# Patient Record
Sex: FEMALE | Race: WHITE | NOT HISPANIC OR LATINO | Employment: OTHER | URBAN - METROPOLITAN AREA
[De-identification: names, ages, dates, MRNs, and addresses within clinical notes are randomized per-mention and may not be internally consistent; named-entity substitution may affect disease eponyms.]

---

## 2017-01-19 ENCOUNTER — HOSPITAL ENCOUNTER (OUTPATIENT)
Dept: MAMMOGRAPHY | Facility: CLINIC | Age: 52
Discharge: HOME | End: 2017-01-19
Attending: SPECIALIST | Admitting: SPECIALIST
Payer: COMMERCIAL

## 2017-01-19 DIAGNOSIS — Z13.9 SCREENING: ICD-10-CM

## 2017-01-19 PROCEDURE — G0202 SCR MAMMO BI INCL CAD: HCPCS

## 2019-11-09 ENCOUNTER — OFFICE VISIT (OUTPATIENT)
Dept: URGENT CARE | Facility: URGENT CARE | Age: 54
End: 2019-11-09
Payer: COMMERCIAL

## 2019-11-09 VITALS
SYSTOLIC BLOOD PRESSURE: 156 MMHG | TEMPERATURE: 98.6 F | OXYGEN SATURATION: 100 % | WEIGHT: 188.9 LBS | DIASTOLIC BLOOD PRESSURE: 86 MMHG | HEART RATE: 66 BPM

## 2019-11-09 DIAGNOSIS — R30.0 DYSURIA: ICD-10-CM

## 2019-11-09 DIAGNOSIS — M54.16 LUMBAR RADICULOPATHY: Primary | ICD-10-CM

## 2019-11-09 LAB
ALBUMIN UR-MCNC: NEGATIVE MG/DL
APPEARANCE UR: CLEAR
BILIRUB UR QL STRIP: NEGATIVE
COLOR UR AUTO: YELLOW
GLUCOSE UR STRIP-MCNC: NEGATIVE MG/DL
HGB UR QL STRIP: NEGATIVE
KETONES UR STRIP-MCNC: NEGATIVE MG/DL
LEUKOCYTE ESTERASE UR QL STRIP: NEGATIVE
NITRATE UR QL: NEGATIVE
PH UR STRIP: 7 PH (ref 5–7)
SOURCE: NORMAL
SP GR UR STRIP: 1.01 (ref 1–1.03)
UROBILINOGEN UR STRIP-ACNC: 0.2 EU/DL (ref 0.2–1)

## 2019-11-09 PROCEDURE — 99203 OFFICE O/P NEW LOW 30 MIN: CPT | Performed by: PHYSICIAN ASSISTANT

## 2019-11-09 PROCEDURE — 81003 URINALYSIS AUTO W/O SCOPE: CPT | Performed by: PHYSICIAN ASSISTANT

## 2019-11-09 RX ORDER — MONTELUKAST SODIUM 10 MG/1
TABLET ORAL
COMMUNITY
Start: 2019-02-07

## 2019-11-09 RX ORDER — ONDANSETRON 4 MG/1
TABLET, FILM COATED ORAL
Refills: 3 | COMMUNITY
Start: 2019-07-06

## 2019-11-09 RX ORDER — TRAZODONE HYDROCHLORIDE 50 MG/1
25-50 TABLET, FILM COATED ORAL
Refills: 0 | COMMUNITY
Start: 2019-09-23

## 2019-11-09 RX ORDER — LEVOTHYROXINE SODIUM 88 UG/1
TABLET ORAL
COMMUNITY
Start: 2019-03-13 | End: 2024-07-30

## 2019-11-09 RX ORDER — DIAZEPAM 10 MG
TABLET ORAL
Refills: 0 | COMMUNITY
Start: 2019-08-08

## 2019-11-09 RX ORDER — ELETRIPTAN HYDROBROMIDE 40 MG/1
TABLET, FILM COATED ORAL
COMMUNITY
Start: 2009-09-22

## 2019-11-09 NOTE — PATIENT INSTRUCTIONS
Patient Education     Understanding Lumbar Radiculopathy    Lumbar radiculopathy is irritation or inflammation of a nerve root in the low back. It causes symptoms that spread out from the back down one or both legs. To understand this condition, it helps to understand the parts of the spine:    Vertebrae. These are bones that stack to form the spine. The lumbar spine contains the 5 bottom vertebrae.    Disks. These are soft pads of tissue between the vertebrae. They act as shock absorbers for the spine.    Spinal canal. This is a tunnel formed within the stacked vertebrae. In the lumbar spine, nerves run through this canal.    Nerves. These branch off and leave the spinal canal, traveling out to parts of the body. As they leave the spinal canal, nerves pass through openings between the vertebrae. The nerve root is the part of the nerve that is closest to the spinal canal.    Sciatic nerve. This is a large nerve formed from several nerve roots in the low back. This nerve extends down the back of the leg to the foot.  With lumbar radiculopathy, nerve roots in the low back become irritated. This leads to pain and symptoms. The sciatic nerve is commonly involved, so the condition is often called sciatica.  What causes lumbar radiculopathy?  Aging, injury, poor posture, extra body weight, and other issues can lead to problems in the low back. These problems may then irritate nerve roots. They include:    Damage to a disk in the lumbar spine. The damaged disk may then press on nearby nerve roots.    Degeneration from wear and tear, and aging. This can lead to narrowing (stenosis) of the openings between the vertebrae. The narrowed openings press on nerve roots as they leave the spinal canal.    Unstable spine. This is when a vertebra slips forward. It can then press on a nerve root.  Other, less common things can put pressure on nerves in the low back. These include diabetes, infection, or a tumor.  Symptoms of lumbar  radiculopathy  These include:    Pain in the low back    Pain, numbness, tingling, or weakness that travels into the buttocks, hip, groin, or leg    Muscle spasms  Treatment for lumbar radiculopathy  In most cases, your healthcare provider will first try treatments that help relieve symptoms. These may include:    Prescription and over-the-counter pain medicines. These help relieve pain, swelling, and irritation.    Limits on positions and activities that increase pain. But lying in bed or avoiding all movement is only recommended for a short period of time.    Physical therapy, including exercises and stretches. This helps decrease pain and increase movement and function.    Steroid shots into the lower back. This may help relieve symptoms for a time.    Weight-loss program. If you are overweight, losing extra pounds may help relieve symptoms.  In some cases, you may need surgery to fix the underlying problem. This depends on the cause, the symptoms, and how long the pain has lasted.  Possible complications  Over time, an irritated and inflamed nerve may become damaged. This may lead to long-lasting (permanent) numbness or weakness in your legs and feet. If symptoms change suddenly or get worse, be sure to let your healthcare provider know.  When to call your healthcare provider  Call your healthcare provider right away if you have any of these:    New pain or pain that gets worse    New or increasing weakness, tingling, or numbness in your leg or foot    Problems controlling your bladder or bowel   Date Last Reviewed: 3/10/2016    8117-3158 [employer]. [address] [place] [address], [place] [address]. All rights reserved. This information is not intended as a substitute for professional medical care. Always follow your healthcare professional's instructions.

## 2019-11-09 NOTE — PROGRESS NOTES
Nunu Martines is a 54 y.o. female, with a pmhx that includes L5 disc herniation (following--seen at [facility] twice in past 2 months), fibromyalgia, Migraine, Hypothyroidism, asthma, insomnia, presenting to  today requesting screening for UTI.     HPI: she reports back spasm sensation across entire low back for past 1-2 weeks duration. She is following [facility] and has been referred to PT. Patient states it occurred to her that she has had some increased frequency of urination. She is not sure if that is due to increased water intake or if it could be a UTI.     Pt denies any LE weakness, foot drop, bowel or bladder changes, saddle area parasthesias or prolonged LE numbness.        ROS:     CONSITUTIONAL: Denies any fever, chills or acute onset weakness  CARDIAC: Denies any CP or SOB  RESP: Aniceto any cough, CP or SOB    GI: Positive for nausea patient relates to dx of atypical migraines below in Neuro ROS. Denies any abdominal pain. Denies any F/C/V/D.   URINARY: Patient reports increased urinary frequency. She denies any dysuria, urinary urgency/frequncy, hematuria  GYN: Denies any pelvic pain. No abnormal vaginal discharge.   SKIN: Denies rash   NEURO: Positive for hx of atypical migraine HA that she describes as nausea without any associated HA that is triggered by barometric weather changes. Patient has rx for prn Zofran. She has had some need for Zofran recently. Denies any confusion or mental status changes  ENDOCRINE: Patient tells me she has been screened for DM with negative result      PMHX: L5 disc herniation (following--seen at [facility] twice in past 2 months), fibromyalgia, Migraine, Hypothyroidism, asthma, insomnia        Current Outpatient Medications   Medication     ALBUTEROL IN     eletriptan (RELPAX) 40 MG tablet     levothyroxine (SYNTHROID/LEVOTHROID) 88 MCG tablet     montelukast (SINGULAIR) 10 MG tablet     diazepam (VALIUM) 10 MG tablet     ondansetron (ZOFRAN) 4 MG tablet     traZODone  (DESYREL) 50 MG tablet     No current facility-administered medications for this visit.      Allergies   Allergen Reactions     Penicillins Anaphylaxis and Rash     PN: LW Reaction: SYNCOPE       Hydrocodone-Acetaminophen Itching and Nausea and Vomiting     Other reaction(s): GI intolerance  PN: LW Reaction: Itching, Pruritis       Nsaids Other (See Comments)     PN: LW Reaction: EDEMA, GENERALIZED     Sulfa Drugs Nausea and Vomiting and Other (See Comments)             OBJECTIVE:  BP (!) 156/86 (BP Location: Right arm, Patient Position: Chair, Cuff Size: Adult Large)   Pulse 66   Temp 98.6  F (37  C) (Oral)   Wt 85.7 kg (188 lb 14.4 oz)   SpO2 100%       GENERAL:  Very pleasant, comfortable and generally well appearing.  Back examination: Back symmetric, no curvature.  Mild lumbar paravertebral muscle spasm bilaterally. Neuro:  Gait within normal limits.  Quadriceps and great toe strength good and equal bilaterally.  Patellar  reflexes good and equal bilaterally. Sensation in multiple dermatomal distributions LE good and equal bilaterally.  Able to forward bend with fingertips to mid tibia.  Normal side bending.    STRAIGHT LEG RAISE: negative   RESP: lungs clear to auscultation - no rales, rhonchi or wheezes  CV: regular rates and rhythm, normal S1 S2, no murmur noted  ABDOMEN:  soft, nontender, no HSM or masses and bowel sounds normal. No CVA tenderness. No suprapubic tenderness   NEURO: Normal strength and tone with no weakness or sensory deficit noted, reflexes normal   SKIN: no suspicious lesions or rashes  PSYCH: NAD     Offered but declines glucose screening/diabetes screening     Results for orders placed or performed in visit on 11/09/19   UA reflex to Microscopic and Culture     Status: None   Result Value Ref Range    Color Urine Yellow     Appearance Urine Clear     Glucose Urine Negative NEG^Negative mg/dL    Bilirubin Urine Negative NEG^Negative    Ketones Urine Negative NEG^Negative mg/dL     "Specific Gravity Urine 1.015 1.003 - 1.035    Blood Urine Negative NEG^Negative    pH Urine 7.0 5.0 - 7.0 pH    Protein Albumin Urine Negative NEG^Negative mg/dL    Urobilinogen Urine 0.2 0.2 - 1.0 EU/dL    Nitrite Urine Negative NEG^Negative    Leukocyte Esterase Urine Negative NEG^Negative    Source Midstream Urine        ASSESSMENT/PLAN:    (M54.16) Lumbar radiculopathy  (primary encounter diagnosis)  MDM: Prolonged, bilateral, low back spasms in a 54 y.o. woman with a pmhx that includes L5 disc herniation (following--seen at [facility] twice in past 2 months), fibromyalgia, Migraine, Hypothyroidism, asthma, insomnia requesting evaluation for possible co-existing UTI. No evidence of UTI on screening UA. Exam is reassuring. Patient is offered but declines glucose/diabetes screening today.     Plan:     1. Continue to follow with orthopedic and PT MD as previously advised for back pain   2. Back urgency and emergency criteria are reviewed   3. Classic UTI signs and sxs are reviewed with patient. Follow-up if any sxs develope  4.  In addition to the above, back \"red flag\" signs and sxs are reviewed with pt both verbally and by way of printed educational material for home review.  Pt verbalizes understanding of and agrees to the above plan.       (R30.0) Dysuria  Comment: atypical   Plan: UA reflex to Microscopic and Culture        "

## 2019-12-15 ENCOUNTER — HEALTH MAINTENANCE LETTER (OUTPATIENT)
Age: 54
End: 2019-12-15

## 2020-10-16 ENCOUNTER — ANCILLARY PROCEDURE (OUTPATIENT)
Dept: MAMMOGRAPHY | Facility: CLINIC | Age: 55
End: 2020-10-16
Payer: COMMERCIAL

## 2020-10-16 DIAGNOSIS — Z12.31 VISIT FOR SCREENING MAMMOGRAM: ICD-10-CM

## 2020-10-16 PROCEDURE — 77063 BREAST TOMOSYNTHESIS BI: CPT | Performed by: RADIOLOGY

## 2020-10-16 PROCEDURE — 77067 SCR MAMMO BI INCL CAD: CPT | Performed by: RADIOLOGY

## 2021-01-15 ENCOUNTER — HEALTH MAINTENANCE LETTER (OUTPATIENT)
Age: 56
End: 2021-01-15

## 2021-10-24 ENCOUNTER — HEALTH MAINTENANCE LETTER (OUTPATIENT)
Age: 56
End: 2021-10-24

## 2021-12-19 ENCOUNTER — HEALTH MAINTENANCE LETTER (OUTPATIENT)
Age: 56
End: 2021-12-19

## 2022-02-13 ENCOUNTER — HEALTH MAINTENANCE LETTER (OUTPATIENT)
Age: 57
End: 2022-02-13

## 2022-03-15 ENCOUNTER — OFFICE VISIT (OUTPATIENT)
Dept: VASCULAR SURGERY | Facility: CLINIC | Age: 57
End: 2022-03-15
Payer: COMMERCIAL

## 2022-03-15 DIAGNOSIS — I83.893 VARICOSE VEINS OF LEG WITH SWELLING, BILATERAL: Primary | ICD-10-CM

## 2022-03-15 DIAGNOSIS — E66.811 CLASS 1 OBESITY WITH SERIOUS COMORBIDITY AND BODY MASS INDEX (BMI) OF 31.0 TO 31.9 IN ADULT, UNSPECIFIED OBESITY TYPE: ICD-10-CM

## 2022-03-15 PROCEDURE — 99203 OFFICE O/P NEW LOW 30 MIN: CPT | Performed by: SURGERY

## 2022-03-15 RX ORDER — CETIRIZINE HYDROCHLORIDE 10 MG/1
1 TABLET ORAL PRN
COMMUNITY

## 2022-03-15 RX ORDER — METHYLPREDNISOLONE 4 MG/1
TABLET ORAL
COMMUNITY
Start: 2021-08-12 | End: 2024-07-30

## 2022-03-15 RX ORDER — HYDROCHLOROTHIAZIDE 25 MG/1
TABLET ORAL
COMMUNITY
Start: 2021-10-22

## 2022-03-15 NOTE — LETTER
"    3/15/2022         RE: Nunu Martines  [address] [facility] [id number]        Dear Colleague,    Thank you for referring your patient, Nunu Martines, to the [facility] [place]. Please see a copy of my visit note below.    VEINSOLUTIONS CONSULTATION    HPI:    Nunu Martines is a pleasant 56 year old female referred by Dr. Ro for several concerns.  The first is with bilateral lower extremity edema which she has had intermittent in the past but has been able to eliminate.  The most recent swelling seems to have begun in October 2021 after taking NSAIDs which she says caused her legs to swell.  She has had a difficult time eliminating swelling since then.  She was placed on a diuretic for period of time but without much help in controlling her edema.    She has not noted varicose veins, erythema of her legs, pleuritic chest pain or shortness of breath.  She has not traveled around the time of the onset of swelling either.  She has not had any venous imaging.    The patient also states that she has \"cold feet\" which become worse after taking naps and when retiring to bed at night if she does not soak her feet in a warm tub prior to retiring to bed.  She does not describe significant pain but admits that they sometimes become somewhat gray.    The next concern is of radicular pain which may be originating from lumbar disc pathology.  The pain extends from the low back into the right buttock and down the posterior right thigh.  She wonders if this may be contributing to some tingling that she has of her feet from time to time.    The patient admits that she has had sclerotherapy on 2 different occasions, the first around 2003 2004 and the second in 2008.  With the first treatment, she had some bulging posterior thigh veins that were injected with improvement in them.  He has not complained of pain in the area of veins.  She has no history of deep vein thrombosis, " superficial thrombophlebitis or hemorrhage.  Patient admits to a right knee injury with a right lateral meniscal tear.    Her family history is significant for spider veins in her mother.     PAST MEDICAL HISTORY: No past medical history on file.    PAST SURGICAL HISTORY: No past surgical history on file.    FAMILY HISTORY: No family history on file.    SOCIAL HISTORY:   Social History     Tobacco Use     Smoking status: Never Smoker     Smokeless tobacco: Never Used   Substance Use Topics     Alcohol use: Not on file       REVIEW OF SYSTEMS: Review Of Systems  Skin: itching  Eyes: negative  Ears/Nose/Throat: Sore throat, dizziness  Respiratory: No shortness of breath, dyspnea on exertion, cough, or hemoptysis  Cardiovascular: negative  Gastrointestinal: Nausea from migraines  Genitourinary: negative  Musculoskeletal: Arthritis, back pain, leg pain, foot pain, weakness, leg discomfort and leg swelling  Neurologic: migraine headaches  Psychiatric: Depression, anxiety, panic attacks  Hematologic/Lymphatic/Immunologic: negative  Endocrine: Hypothyroidism, hot flashes      Vital signs:  There were no vitals taken for this visit.    Current Outpatient Medications   Medication Sig Dispense Refill     levothyroxine (SYNTHROID/LEVOTHROID) 88 MCG tablet TAKE 1 TABLET (88 MCG) BY MOUTH ONCE DAILY FOR 90 DAYS       montelukast (SINGULAIR) 10 MG tablet TAKE 1 TABLET BY MOUTH ONCE EVERY EVENING.       traZODone (DESYREL) 50 MG tablet 25-50 MG per patient  0     ALBUTEROL IN Inhale 1-2 puffs into the lungs       cetirizine (ZYRTEC ALLERGY) 10 MG tablet Take 1 tablet by mouth as needed       diazepam (VALIUM) 10 MG tablet TAKE 1 TABLET BY MOUTH EVERY 8 HOURS AS NEEDED  0     eletriptan (RELPAX) 40 MG tablet as needed.       hydrochlorothiazide (HYDRODIURIL) 25 MG tablet TAKE 1 (ONE) TABLET DAILY IN THE MORNING AS NEEDED       methylPREDNISolone (MEDROL) 4 MG tablet TAKE 1 TABLET BY MOUTH TWICE A DAY       ondansetron (ZOFRAN) 4 MG  tablet TAKE ONE TABLET BY MOUTH UP TO THREE TIMES PER DAY AS NEEDED  3       PHYSICAL EXAM:  General: Pleasant, NAD.   HEENT: Normocephalic, atraumatic, external ears and nose normal.   Respiratory: Normal respiratory effort.   Cardiovascular: Pulse is regular.   Musculoskeletal: Gait and station normal.  The joints of her fingers and toes without deformity.  There is no cyanosis of her nailbeds.   EXTREMITIES: Right lower extremity: Scattered telangiectasias over the posterior lateral thigh with a reticular vein clot/small varicose vein on the distal posterior lateral right thigh.  No stasis changes.  Trace edema.    Left lower extremity: Few scattered telangiectasias over the thigh.  Few reticular veins scattered over the thigh but no significant varicose veins, stasis changes.  She has trace edema..    PULSES: R/L (3=normal pulse, 0=no palpable pulse) femoral: 3/3; popliteal: 3/3 dorsalis pedis: 3/3; posterior tibial: 3/3.      Neurologic: Grossly normal  Psychiatric: Mood, affect, judgment and insight are normal     ASSESSMENT:  Bilateral lower extremity swelling with reticular veins and telangiectasias as well as cold feet bilaterally.  We discussed emotionally vein anatomy as well as the pathophysiology of venous insufficiency.  She does not have significant varicose veins, therefore the likelihood of having significant underlying venous insufficiency is lower.  Given the swelling, I feel it is reasonable to obtain a bilateral lower extremity venous competency study.    Her cool feet are not on the basis of arterial insufficiency as she has normal lower extremity pulses.  She could have an element of cold-induced vasospasm, discussed briefly.    The tingling of her feet could well be related to hard disc problems but could be from multiple other etiologies as well.    PLAN:  Bilateral lower extremity venous competency study with video visit to discuss results.  The patient inquired about adjuncts and weight  loss.  I will direct her to the [place] weight loss clinic.     Cruz Freitas MD    Dictated using Dragon voice recognition software which may result in transcription errors          Patient Reported symptoms:    Right leg   Heaviness Some of the time   Achiness Some of the time   Swelling Some of the time   Throbbing Some of the time   Itching None of the time   Appearance Moderately noticeable   Impact on work/activities Mildly reduced     Left Leg   Heaviness A little of the time   Achiness A little of the time   Swelling A little of the time  Throbbing Some of the time   Itching None of the time   Appearance Slightly noticeable   Impact on work/activities Mildly reduced       Again, thank you for allowing me to participate in the care of your patient.        Sincerely,        Cruz Freitas MD

## 2022-03-15 NOTE — PROGRESS NOTES
"VEINSOLUTIONS CONSULTATION    HPI:    Nunu Martines is a pleasant 56 year old female referred by Dr. Ro for several concerns.  The first is with bilateral lower extremity edema which she has had intermittent in the past but has been able to eliminate.  The most recent swelling seems to have begun in October 2021 after taking NSAIDs which she says caused her legs to swell.  She has had a difficult time eliminating swelling since then.  She was placed on a diuretic for period of time but without much help in controlling her edema.    She has not noted varicose veins, erythema of her legs, pleuritic chest pain or shortness of breath.  She has not traveled around the time of the onset of swelling either.  She has not had any venous imaging.    The patient also states that she has \"cold feet\" which become worse after taking naps and when retiring to bed at night if she does not soak her feet in a warm tub prior to retiring to bed.  She does not describe significant pain but admits that they sometimes become somewhat gray.    The next concern is of radicular pain which may be originating from lumbar disc pathology.  The pain extends from the low back into the right buttock and down the posterior right thigh.  She wonders if this may be contributing to some tingling that she has of her feet from time to time.    The patient admits that she has had sclerotherapy on 2 different occasions, the first around 2003 2004 and the second in 2008.  With the first treatment, she had some bulging posterior thigh veins that were injected with improvement in them.  He has not complained of pain in the area of veins.  She has no history of deep vein thrombosis, superficial thrombophlebitis or hemorrhage.  Patient admits to a right knee injury with a right lateral meniscal tear.    Her family history is significant for spider veins in her mother.     PAST MEDICAL HISTORY: No past medical history on file.    PAST SURGICAL " HISTORY: No past surgical history on file.    FAMILY HISTORY: No family history on file.    SOCIAL HISTORY:   Social History     Tobacco Use     Smoking status: Never Smoker     Smokeless tobacco: Never Used   Substance Use Topics     Alcohol use: Not on file       REVIEW OF SYSTEMS: Review Of Systems  Skin: itching  Eyes: negative  Ears/Nose/Throat: Sore throat, dizziness  Respiratory: No shortness of breath, dyspnea on exertion, cough, or hemoptysis  Cardiovascular: negative  Gastrointestinal: Nausea from migraines  Genitourinary: negative  Musculoskeletal: Arthritis, back pain, leg pain, foot pain, weakness, leg discomfort and leg swelling  Neurologic: migraine headaches  Psychiatric: Depression, anxiety, panic attacks  Hematologic/Lymphatic/Immunologic: negative  Endocrine: Hypothyroidism, hot flashes      Vital signs:  There were no vitals taken for this visit.    Current Outpatient Medications   Medication Sig Dispense Refill     levothyroxine (SYNTHROID/LEVOTHROID) 88 MCG tablet TAKE 1 TABLET (88 MCG) BY MOUTH ONCE DAILY FOR 90 DAYS       montelukast (SINGULAIR) 10 MG tablet TAKE 1 TABLET BY MOUTH ONCE EVERY EVENING.       traZODone (DESYREL) 50 MG tablet 25-50 MG per patient  0     ALBUTEROL IN Inhale 1-2 puffs into the lungs       cetirizine (ZYRTEC ALLERGY) 10 MG tablet Take 1 tablet by mouth as needed       diazepam (VALIUM) 10 MG tablet TAKE 1 TABLET BY MOUTH EVERY 8 HOURS AS NEEDED  0     eletriptan (RELPAX) 40 MG tablet as needed.       hydrochlorothiazide (HYDRODIURIL) 25 MG tablet TAKE 1 (ONE) TABLET DAILY IN THE MORNING AS NEEDED       methylPREDNISolone (MEDROL) 4 MG tablet TAKE 1 TABLET BY MOUTH TWICE A DAY       ondansetron (ZOFRAN) 4 MG tablet TAKE ONE TABLET BY MOUTH UP TO THREE TIMES PER DAY AS NEEDED  3       PHYSICAL EXAM:  General: Pleasant, NAD.   HEENT: Normocephalic, atraumatic, external ears and nose normal.   Respiratory: Normal respiratory effort.   Cardiovascular: Pulse is regular.    Musculoskeletal: Gait and station normal.  The joints of her fingers and toes without deformity.  There is no cyanosis of her nailbeds.   EXTREMITIES: Right lower extremity: Scattered telangiectasias over the posterior lateral thigh with a reticular vein/small varicose vein on the distal posterior lateral right thigh.  No stasis changes.  Trace edema.    Left lower extremity: Few scattered telangiectasias over the thigh.  Few reticular veins scattered over the thigh but no significant varicose veins, stasis changes.  She has trace edema..    PULSES: R/L (3=normal pulse, 0=no palpable pulse) femoral: 3/3; popliteal: 3/3 dorsalis pedis: 3/3; posterior tibial: 3/3.      Neurologic: Grossly normal  Psychiatric: Mood, affect, judgment and insight are normal     ASSESSMENT:  Bilateral lower extremity swelling with reticular veins and telangiectasias as well as cold feet bilaterally.  We discussed emotionally vein anatomy as well as the pathophysiology of venous insufficiency.  She does not have significant varicose veins, therefore the likelihood of having significant underlying venous insufficiency is lower.  Given the swelling, I feel it is reasonable to obtain a bilateral lower extremity venous competency study.    Her cool feet are not on the basis of arterial insufficiency as she has normal lower extremity pulses.  She could have an element of cold-induced vasospasm, discussed briefly.    The tingling of her feet could well be related to hard disc problems but could be from multiple other etiologies as well.    PLAN:  Bilateral lower extremity venous competency study with video visit to discuss results.  The patient inquired about adjuncts and weight loss.  I will direct her to the [facility] weight loss clinic.     Cruz Freitas MD    Dictated using Dragon voice recognition software which may result in transcription errors          Patient Reported symptoms:    Right leg   Heaviness Some of the time    Achiness Some of the time   Swelling Some of the time   Throbbing Some of the time   Itching None of the time   Appearance Moderately noticeable   Impact on work/activities Mildly reduced     Left Leg   Heaviness A little of the time   Achiness A little of the time   Swelling A little of the time  Throbbing Some of the time   Itching None of the time   Appearance Slightly noticeable   Impact on work/activities Mildly reduced

## 2022-03-18 ENCOUNTER — ANCILLARY PROCEDURE (OUTPATIENT)
Dept: ULTRASOUND IMAGING | Facility: CLINIC | Age: 57
End: 2022-03-18
Attending: SURGERY
Payer: COMMERCIAL

## 2022-03-18 DIAGNOSIS — I83.893 VARICOSE VEINS OF LEG WITH SWELLING, BILATERAL: ICD-10-CM

## 2022-03-18 PROCEDURE — 93970 EXTREMITY STUDY: CPT | Performed by: SURGERY

## 2022-03-21 ENCOUNTER — VIRTUAL VISIT (OUTPATIENT)
Dept: VASCULAR SURGERY | Facility: CLINIC | Age: 57
End: 2022-03-21
Attending: SURGERY
Payer: COMMERCIAL

## 2022-03-21 DIAGNOSIS — I83.893 VARICOSE VEINS OF LEG WITH SWELLING, BILATERAL: Primary | ICD-10-CM

## 2022-03-21 PROCEDURE — 99213 OFFICE O/P EST LOW 20 MIN: CPT | Mod: 95 | Performed by: SURGERY

## 2022-03-21 NOTE — LETTER
3/21/2022         RE: Nunu Martines  [address] [facility] [id number]        Dear Colleague,    Thank you for referring your patient, Nunu Martines, to the [facility]. Please see a copy of my visit note below.    Nunu is a 56 year old who is being evaluated via a billable video visit.      How would you like to obtain your AVS? MyChart  If the video visit is dropped, the invitation should be resent by: Text to cell phone: [phone]  Will anyone else be joining your video visit? No      Video Start Time: 11:17 AM      Video-Visit Details    Type of service:  Video Visit    Video End Time:11:29 AM    Originating Location (pt. Location): Home    Distant Location (provider location):  [facility]     Platform used for Video Visit: [employer]     [facility] Progress Note    Nunu Martines presents in follow-up of bilateral lower extremity edema, right lower extremity pain and bilateral cold feet.  Please see my consultation of 3/15/2022 for details.  She returned on 3/18/2022 for bilateral lower extremity venous competency studies, the results of which we will discuss on today's video visit    Physical Exam  General: Pleasant female in no acute distress.  Blood pressure 147/75, pulse 79  Extremities: Right lower extremity: Scattered telangiectasias over the posterior lateral thigh with a reticular vein and small varicose vein on the distal posterior lateral right thigh.  No stasis changes.  Trace edema.    Left lower extremity: Few telangiectasias over the thigh.  Reticular veins scattered over the thigh but no significant varicose veins, stasis changes.  She has trace edema..      Ultrasound:  INDICATION:  Varicose veins of bilateral lower extremities with swelling.     EXAM TYPE  BILATERAL LOWER EXTREMITY VENOUS DUPLEX FOR VENOUS INSUFFICIENCY  TECHNICAL SUMMARY     A duplex ultrasound study  using color flow was performed to evaluate the bilateral lower extremity veins for valvular incompetence with the patient in a steep reversed trendelenberg.      RIGHT:     The deep veins demonstrate phasic flow, compress, and respond to augmentations.  There is no evidence of DVT.  The mid femoral vein is incompetent and free of thrombus. The remaining deep veins are competent and free of thrombus.      The GSV demonstrates phasic flow, compresses, and responds to augmentations from the saphenofemoral junction to the ankle with no evidence of thrombus.  The GSV courses superficially out of the fascia from the distal thigh to the mid calf. The great saphenous vein measures 7.2 mm at the saphenofemoral junction, 5.1 mm in the proximal thigh, and 3.6 mm at the knee.The GSV is incompetent at the SFJ and again from the proximal to mid calf with the greatest reflux time of 4586 milliseconds.      The AASV is competent ( 3.1 mm) draining into the saphenofemoral junction.      The Giacomini vein is competent ( 2.4 mm) communicating with the small saphenous vein at the knee level.      The SSV demonstrates phasic flow, compresses, and responds to augmentations from the popliteal space to the ankle.  No thrombus is seen. The saphenopopliteal junction is absent. The SSV is incompetent in the proximal calf with a reflux time of 5246 milliseconds.       Perforators: There is no evidence of incompetent  veins at any level.      A non-vascularized fluid collection, likely a Baker's Cyst, is found in the posteromedial knee measuring 2.7 x 1.4 x .82 cm.     LEFT:     The deep veins demonstrate phasic flow, compress, and respond to augmentations.  There is no reflux or DVT.  us.      The GSV demonstrates phasic flow, compresses and responds to augmentations from the saphenofemoral junction to the ankle with no evidence of thrombus. The great saphenous vein measures 7.3 mm at the saphenofemoral junction, 4.2 mm in the  proximal thigh, and 4.4 mm at the knee.The GSV is incompetent in the mid calf with a reflux time of 1105 milliseconds.       The AASV is competent ( 4.4 mm) draining into the saphenofemoral junction.      The Giacomini vein is competent ( .8 mm) communicating with the small saphenous vein at the knee level.      The SSV demonstrates phasic flow, compresses, and responds to augmentations from the popliteal space to the ankle.  No reflux or thrombus is seen. The saphenopopliteal junction is absent.      Perforators: There is no evidence of incompetent  veins at any level.      FINAL SUMMARY:  1.         No evidence of bilateral lower extremity deep vein thrombus.  2.         Right mid femoral vein incompetence.  No evidence of left deep vein incompetence.  3.         Right greater saphenous vein incompetence.  4.         Right proximal small saphenous vein incompetence.  5.         Right Baker's Cyst 2.7 x 1.4 x .82 cm  6.         Left greater saphenous vein incompetence in the mid calf only.  7.         Left varicose vein incompetence.  8.         Superficial and perforating veins time of incompetence is >500 ms and >1000 ms for deep vein incompetence    Assessment:  Bilateral lower extremity swelling with some right leg pain and cold feet.  The ultrasound does not reveal any significant superficial or deep venous insufficiency that might explain her symptoms.  The limited incompetence poses very low risk.    We discussed the risks of conservative management which include slight progression of the disease process, superficial thrombophlebitis or hemorrhage.  The patient asked whether the discomfort she is experiencing in the proximal anterolateral right thigh might be explained by the varicosities.  Given her limited saphenofemoral junction and right common femoral vein incompetence, I do not feel those symptoms are related to the ultrasound findings.    Plan:  Continue conservative management with  increasing activities, compression and return if other concerns or questions arise.  The patient and her  voiced understanding and their questions were answered.    Cruz Freitas MD    Dictated using Dragon voice recognition software which may result in transcription errors        Again, thank you for allowing me to participate in the care of your patient.        Sincerely,        Cruz Freitas MD

## 2022-03-21 NOTE — PROGRESS NOTES
Nunu is a 56 year old who is being evaluated via a billable video visit.      How would you like to obtain your AVS? MyChart  If the video visit is dropped, the invitation should be resent by: Text to cell phone: [phone]  Will anyone else be joining your video visit? No      Video Start Time: 11:17 AM      Video-Visit Details    Type of service:  Video Visit    Video End Time:11:29 AM    Originating Location (pt. Location): Home    Distant Location (provider location):  [facility]     Platform used for Video Visit: [employer]     [facility] [place] Progress Note    Nunu Martines presents in follow-up of bilateral lower extremity edema, right lower extremity pain and bilateral cold feet.  Please see my consultation of 3/15/2022 for details.  She returned on 3/18/2022 for bilateral lower extremity venous competency studies, the results of which we will discuss on today's video visit    Physical Exam  General: Pleasant female in no acute distress.  Blood pressure 147/75, pulse 79  Extremities: Right lower extremity: Scattered telangiectasias over the posterior lateral thigh with a reticular vein and small varicose vein on the distal posterior lateral right thigh.  No stasis changes.  Trace edema.    Left lower extremity: Few telangiectasias over the thigh.  Reticular veins scattered over the thigh but no significant varicose veins, stasis changes.  She has trace edema..      Ultrasound:  INDICATION:  Varicose veins of bilateral lower extremities with swelling.     EXAM TYPE  BILATERAL LOWER EXTREMITY VENOUS DUPLEX FOR VENOUS INSUFFICIENCY  TECHNICAL SUMMARY     A duplex ultrasound study using color flow was performed to evaluate the bilateral lower extremity veins for valvular incompetence with the patient in a steep reversed trendelenberg.      RIGHT:     The deep veins demonstrate phasic flow, compress, and respond to augmentations.  There is no  evidence of DVT.  The mid femoral vein is incompetent and free of thrombus. The remaining deep veins are competent and free of thrombus.      The GSV demonstrates phasic flow, compresses, and responds to augmentations from the saphenofemoral junction to the ankle with no evidence of thrombus.  The GSV courses superficially out of the fascia from the distal thigh to the mid calf. The great saphenous vein measures 7.2 mm at the saphenofemoral junction, 5.1 mm in the proximal thigh, and 3.6 mm at the knee.The GSV is incompetent at the SFJ and again from the proximal to mid calf with the greatest reflux time of 4586 milliseconds.      The AASV is competent ( 3.1 mm) draining into the saphenofemoral junction.      The Giacomini vein is competent ( 2.4 mm) communicating with the small saphenous vein at the knee level.      The SSV demonstrates phasic flow, compresses, and responds to augmentations from the popliteal space to the ankle.  No thrombus is seen. The saphenopopliteal junction is absent. The SSV is incompetent in the proximal calf with a reflux time of 5246 milliseconds.       Perforators: There is no evidence of incompetent  veins at any level.      A non-vascularized fluid collection, likely a Baker's Cyst, is found in the posteromedial knee measuring 2.7 x 1.4 x .82 cm.     LEFT:     The deep veins demonstrate phasic flow, compress, and respond to augmentations.  There is no reflux or DVT.  us.      The GSV demonstrates phasic flow, compresses and responds to augmentations from the saphenofemoral junction to the ankle with no evidence of thrombus. The great saphenous vein measures 7.3 mm at the saphenofemoral junction, 4.2 mm in the proximal thigh, and 4.4 mm at the knee.The GSV is incompetent in the mid calf with a reflux time of 1105 milliseconds.       The AASV is competent ( 4.4 mm) draining into the saphenofemoral junction.      The Giacomini vein is competent ( .8 mm) communicating with the  small saphenous vein at the knee level.      The SSV demonstrates phasic flow, compresses, and responds to augmentations from the popliteal space to the ankle.  No reflux or thrombus is seen. The saphenopopliteal junction is absent.      Perforators: There is no evidence of incompetent  veins at any level.      FINAL SUMMARY:  1.         No evidence of bilateral lower extremity deep vein thrombus.  2.         Right mid femoral vein incompetence.  No evidence of left deep vein incompetence.  3.         Right greater saphenous vein incompetence.  4.         Right proximal small saphenous vein incompetence.  5.         Right Baker's Cyst 2.7 x 1.4 x .82 cm  6.         Left greater saphenous vein incompetence in the mid calf only.  7.         Left varicose vein incompetence.  8.         Superficial and perforating veins time of incompetence is >500 ms and >1000 ms for deep vein incompetence    Assessment:  Bilateral lower extremity swelling with some right leg pain and cold feet.  The ultrasound does not reveal any significant superficial or deep venous insufficiency that might explain her symptoms.  The limited incompetence poses very low risk.    We discussed the risks of conservative management which include slight progression of the disease process, superficial thrombophlebitis or hemorrhage.  The patient asked whether the discomfort she is experiencing in the proximal anterolateral right thigh might be explained by the varicosities.  Given her limited saphenofemoral junction and right common femoral vein incompetence, I do not feel those symptoms are related to the ultrasound findings.    Plan:  Continue conservative management with increasing activities, compression and return if other concerns or questions arise.  The patient and her  voiced understanding and their questions were answered.    Cruz Freitas MD    Dictated using Dragon voice recognition software which may result in  transcription errors

## 2022-10-16 ENCOUNTER — HEALTH MAINTENANCE LETTER (OUTPATIENT)
Age: 57
End: 2022-10-16

## 2023-03-26 ENCOUNTER — HEALTH MAINTENANCE LETTER (OUTPATIENT)
Age: 58
End: 2023-03-26

## 2024-06-01 ENCOUNTER — HEALTH MAINTENANCE LETTER (OUTPATIENT)
Age: 59
End: 2024-06-01

## 2024-07-30 ENCOUNTER — APPOINTMENT (OUTPATIENT)
Dept: GENERAL RADIOLOGY | Facility: CLINIC | Age: 59
End: 2024-07-30
Attending: EMERGENCY MEDICINE
Payer: COMMERCIAL

## 2024-07-30 ENCOUNTER — HOSPITAL ENCOUNTER (OUTPATIENT)
Facility: CLINIC | Age: 59
Setting detail: OBSERVATION
Discharge: HOME | End: 2024-07-30
Attending: EMERGENCY MEDICINE | Admitting: INTERNAL MEDICINE
Payer: COMMERCIAL

## 2024-07-30 VITALS
OXYGEN SATURATION: 93 % | SYSTOLIC BLOOD PRESSURE: 180 MMHG | HEART RATE: 86 BPM | RESPIRATION RATE: 22 BRPM | TEMPERATURE: 97.7 F | DIASTOLIC BLOOD PRESSURE: 102 MMHG

## 2024-07-30 DIAGNOSIS — J96.00 ACUTE RESPIRATORY FAILURE, UNSPECIFIED WHETHER WITH HYPOXIA OR HYPERCAPNIA (H): ICD-10-CM

## 2024-07-30 DIAGNOSIS — U07.1 COVID-19 VIRUS INFECTION: ICD-10-CM

## 2024-07-30 DIAGNOSIS — J45.21 MILD INTERMITTENT ASTHMA WITH EXACERBATION: ICD-10-CM

## 2024-07-30 LAB
ALBUMIN SERPL BCG-MCNC: 4.6 G/DL (ref 3.5–5.2)
ALP SERPL-CCNC: 118 U/L (ref 40–150)
ALT SERPL W P-5'-P-CCNC: 29 U/L (ref 0–50)
ANION GAP SERPL CALCULATED.3IONS-SCNC: 16 MMOL/L (ref 7–15)
ANION GAP SERPL CALCULATED.3IONS-SCNC: 20 MMOL/L (ref 7–15)
AST SERPL W P-5'-P-CCNC: 37 U/L (ref 0–45)
BASE EXCESS BLDV CALC-SCNC: -1.6 MMOL/L (ref -3–3)
BASE EXCESS BLDV CALC-SCNC: 0.8 MMOL/L (ref -3–3)
BASOPHILS # BLD AUTO: 0 10E3/UL (ref 0–0.2)
BASOPHILS NFR BLD AUTO: 0 %
BILIRUB SERPL-MCNC: 0.2 MG/DL
BUN SERPL-MCNC: 10.9 MG/DL (ref 6–20)
BUN SERPL-MCNC: 13.4 MG/DL (ref 6–20)
CALCIUM SERPL-MCNC: 9.3 MG/DL (ref 8.8–10.4)
CALCIUM SERPL-MCNC: 9.6 MG/DL (ref 8.8–10.4)
CHLORIDE SERPL-SCNC: 103 MMOL/L (ref 98–107)
CHLORIDE SERPL-SCNC: 104 MMOL/L (ref 98–107)
CREAT SERPL-MCNC: 0.6 MG/DL (ref 0.51–0.95)
CREAT SERPL-MCNC: 0.75 MG/DL (ref 0.51–0.95)
EGFRCR SERPLBLD CKD-EPI 2021: >90 ML/MIN/1.73M2
EGFRCR SERPLBLD CKD-EPI 2021: >90 ML/MIN/1.73M2
EOSINOPHIL # BLD AUTO: 0.2 10E3/UL (ref 0–0.7)
EOSINOPHIL NFR BLD AUTO: 2 %
ERYTHROCYTE [DISTWIDTH] IN BLOOD BY AUTOMATED COUNT: 12.4 % (ref 10–15)
ERYTHROCYTE [DISTWIDTH] IN BLOOD BY AUTOMATED COUNT: 12.6 % (ref 10–15)
FLUAV RNA SPEC QL NAA+PROBE: NEGATIVE
FLUBV RNA RESP QL NAA+PROBE: NEGATIVE
GLUCOSE SERPL-MCNC: 103 MG/DL (ref 70–99)
GLUCOSE SERPL-MCNC: 170 MG/DL (ref 70–99)
HCG SERPL QL: NEGATIVE
HCO3 BLDV-SCNC: 20 MMOL/L (ref 21–28)
HCO3 BLDV-SCNC: 24 MMOL/L (ref 21–28)
HCO3 SERPL-SCNC: 18 MMOL/L (ref 22–29)
HCO3 SERPL-SCNC: 23 MMOL/L (ref 22–29)
HCT VFR BLD AUTO: 42.4 % (ref 35–47)
HCT VFR BLD AUTO: 44.1 % (ref 35–47)
HGB BLD-MCNC: 14.1 G/DL (ref 11.7–15.7)
HGB BLD-MCNC: 14.5 G/DL (ref 11.7–15.7)
HOLD SPECIMEN: NORMAL
HOLD SPECIMEN: NORMAL
IMM GRANULOCYTES # BLD: 0 10E3/UL
IMM GRANULOCYTES NFR BLD: 0 %
LACTATE SERPL-SCNC: 4.6 MMOL/L (ref 0.7–2)
LYMPHOCYTES # BLD AUTO: 3.1 10E3/UL (ref 0.8–5.3)
LYMPHOCYTES NFR BLD AUTO: 30 %
MCH RBC QN AUTO: 30.7 PG (ref 26.5–33)
MCH RBC QN AUTO: 30.9 PG (ref 26.5–33)
MCHC RBC AUTO-ENTMCNC: 32.9 G/DL (ref 31.5–36.5)
MCHC RBC AUTO-ENTMCNC: 33.3 G/DL (ref 31.5–36.5)
MCV RBC AUTO: 92 FL (ref 78–100)
MCV RBC AUTO: 94 FL (ref 78–100)
MONOCYTES # BLD AUTO: 0.8 10E3/UL (ref 0–1.3)
MONOCYTES NFR BLD AUTO: 8 %
NEUTROPHILS # BLD AUTO: 6.2 10E3/UL (ref 1.6–8.3)
NEUTROPHILS NFR BLD AUTO: 60 %
NRBC # BLD AUTO: 0 10E3/UL
NRBC BLD AUTO-RTO: 0 /100
O2/TOTAL GAS SETTING VFR VENT: 0 %
O2/TOTAL GAS SETTING VFR VENT: 40 %
OXYHGB MFR BLDV: 89 % (ref 70–75)
OXYHGB MFR BLDV: 96 % (ref 70–75)
PCO2 BLDV: 27 MM HG (ref 40–50)
PCO2 BLDV: 35 MM HG (ref 40–50)
PH BLDV: 7.46 [PH] (ref 7.32–7.43)
PH BLDV: 7.48 [PH] (ref 7.32–7.43)
PLATELET # BLD AUTO: 234 10E3/UL (ref 150–450)
PLATELET # BLD AUTO: 255 10E3/UL (ref 150–450)
PO2 BLDV: 55 MM HG (ref 25–47)
PO2 BLDV: 73 MM HG (ref 25–47)
POTASSIUM SERPL-SCNC: 3.4 MMOL/L (ref 3.4–5.3)
POTASSIUM SERPL-SCNC: 3.6 MMOL/L (ref 3.4–5.3)
PROT SERPL-MCNC: 7.6 G/DL (ref 6.4–8.3)
RBC # BLD AUTO: 4.59 10E6/UL (ref 3.8–5.2)
RBC # BLD AUTO: 4.7 10E6/UL (ref 3.8–5.2)
RSV RNA SPEC NAA+PROBE: NEGATIVE
SAO2 % BLDV: 90 % (ref 70–75)
SAO2 % BLDV: 97 % (ref 70–75)
SARS-COV-2 RNA RESP QL NAA+PROBE: POSITIVE
SODIUM SERPL-SCNC: 142 MMOL/L (ref 135–145)
SODIUM SERPL-SCNC: 142 MMOL/L (ref 135–145)
TROPONIN T SERPL HS-MCNC: 10 NG/L
TROPONIN T SERPL HS-MCNC: 9 NG/L
WBC # BLD AUTO: 10.4 10E3/UL (ref 4–11)
WBC # BLD AUTO: 8.6 10E3/UL (ref 4–11)

## 2024-07-30 PROCEDURE — 999N000157 HC STATISTIC RCP TIME EA 10 MIN

## 2024-07-30 PROCEDURE — 250N000009 HC RX 250: Performed by: EMERGENCY MEDICINE

## 2024-07-30 PROCEDURE — 82040 ASSAY OF SERUM ALBUMIN: CPT | Performed by: INTERNAL MEDICINE

## 2024-07-30 PROCEDURE — 84484 ASSAY OF TROPONIN QUANT: CPT | Performed by: INTERNAL MEDICINE

## 2024-07-30 PROCEDURE — 99285 EMERGENCY DEPT VISIT HI MDM: CPT | Mod: 25

## 2024-07-30 PROCEDURE — 99418 PROLNG IP/OBS E/M EA 15 MIN: CPT | Performed by: INTERNAL MEDICINE

## 2024-07-30 PROCEDURE — 82805 BLOOD GASES W/O2 SATURATION: CPT | Performed by: INTERNAL MEDICINE

## 2024-07-30 PROCEDURE — 83605 ASSAY OF LACTIC ACID: CPT | Performed by: INTERNAL MEDICINE

## 2024-07-30 PROCEDURE — 80048 BASIC METABOLIC PNL TOTAL CA: CPT | Performed by: EMERGENCY MEDICINE

## 2024-07-30 PROCEDURE — 36415 COLL VENOUS BLD VENIPUNCTURE: CPT | Performed by: INTERNAL MEDICINE

## 2024-07-30 PROCEDURE — 82805 BLOOD GASES W/O2 SATURATION: CPT | Performed by: EMERGENCY MEDICINE

## 2024-07-30 PROCEDURE — 87637 SARSCOV2&INF A&B&RSV AMP PRB: CPT | Performed by: EMERGENCY MEDICINE

## 2024-07-30 PROCEDURE — 71045 X-RAY EXAM CHEST 1 VIEW: CPT

## 2024-07-30 PROCEDURE — 85004 AUTOMATED DIFF WBC COUNT: CPT | Performed by: EMERGENCY MEDICINE

## 2024-07-30 PROCEDURE — 99207 PR APP CREDIT; MD BILLING SHARED VISIT: CPT | Performed by: INTERNAL MEDICINE

## 2024-07-30 PROCEDURE — 36415 COLL VENOUS BLD VENIPUNCTURE: CPT | Performed by: EMERGENCY MEDICINE

## 2024-07-30 PROCEDURE — 99236 HOSP IP/OBS SAME DATE HI 85: CPT | Performed by: INTERNAL MEDICINE

## 2024-07-30 PROCEDURE — G0378 HOSPITAL OBSERVATION PER HR: HCPCS

## 2024-07-30 PROCEDURE — 96365 THER/PROPH/DIAG IV INF INIT: CPT

## 2024-07-30 PROCEDURE — 80053 COMPREHEN METABOLIC PANEL: CPT | Performed by: EMERGENCY MEDICINE

## 2024-07-30 PROCEDURE — 85018 HEMOGLOBIN: CPT | Performed by: INTERNAL MEDICINE

## 2024-07-30 PROCEDURE — 250N000011 HC RX IP 250 OP 636: Performed by: EMERGENCY MEDICINE

## 2024-07-30 PROCEDURE — 94660 CPAP INITIATION&MGMT: CPT

## 2024-07-30 PROCEDURE — 250N000013 HC RX MED GY IP 250 OP 250 PS 637: Performed by: INTERNAL MEDICINE

## 2024-07-30 PROCEDURE — 84703 CHORIONIC GONADOTROPIN ASSAY: CPT | Performed by: INTERNAL MEDICINE

## 2024-07-30 PROCEDURE — 96375 TX/PRO/DX INJ NEW DRUG ADDON: CPT

## 2024-07-30 PROCEDURE — 94640 AIRWAY INHALATION TREATMENT: CPT

## 2024-07-30 RX ORDER — CLINDAMYCIN PHOSPHATE 10 UG/ML
LOTION TOPICAL 2 TIMES DAILY PRN
COMMUNITY

## 2024-07-30 RX ORDER — ONDANSETRON 2 MG/ML
4 INJECTION INTRAMUSCULAR; INTRAVENOUS EVERY 6 HOURS PRN
Status: DISCONTINUED | OUTPATIENT
Start: 2024-07-30 | End: 2024-07-30 | Stop reason: HOSPADM

## 2024-07-30 RX ORDER — AMOXICILLIN 250 MG
1 CAPSULE ORAL 2 TIMES DAILY PRN
Status: DISCONTINUED | OUTPATIENT
Start: 2024-07-30 | End: 2024-07-30 | Stop reason: HOSPADM

## 2024-07-30 RX ORDER — AMOXICILLIN 250 MG
2 CAPSULE ORAL 2 TIMES DAILY PRN
Status: DISCONTINUED | OUTPATIENT
Start: 2024-07-30 | End: 2024-07-30 | Stop reason: HOSPADM

## 2024-07-30 RX ORDER — BENZONATATE 100 MG/1
100 CAPSULE ORAL 3 TIMES DAILY PRN
Qty: 21 CAPSULE | Refills: 0 | Status: SHIPPED | OUTPATIENT
Start: 2024-07-30

## 2024-07-30 RX ORDER — ELETRIPTAN HYDROBROMIDE 20 MG/1
40 TABLET, FILM COATED ORAL
Status: COMPLETED | OUTPATIENT
Start: 2024-07-30 | End: 2024-07-30

## 2024-07-30 RX ORDER — HYDROCORTISONE 2.5 %
CREAM (GRAM) TOPICAL 2 TIMES DAILY PRN
Status: DISCONTINUED | OUTPATIENT
Start: 2024-07-30 | End: 2024-07-30 | Stop reason: HOSPADM

## 2024-07-30 RX ORDER — ALBUTEROL SULFATE 90 UG/1
2 AEROSOL, METERED RESPIRATORY (INHALATION)
Status: DISCONTINUED | OUTPATIENT
Start: 2024-07-30 | End: 2024-07-30 | Stop reason: HOSPADM

## 2024-07-30 RX ORDER — HYDROCORTISONE 2.5 %
CREAM (GRAM) TOPICAL 2 TIMES DAILY PRN
COMMUNITY

## 2024-07-30 RX ORDER — AZITHROMYCIN 250 MG/1
TABLET, FILM COATED ORAL
Qty: 6 TABLET | Refills: 0 | Status: SHIPPED | OUTPATIENT
Start: 2024-07-30 | End: 2024-08-04

## 2024-07-30 RX ORDER — IPRATROPIUM BROMIDE AND ALBUTEROL SULFATE 2.5; .5 MG/3ML; MG/3ML
SOLUTION RESPIRATORY (INHALATION)
Status: DISCONTINUED
Start: 2024-07-30 | End: 2024-07-30 | Stop reason: HOSPADM

## 2024-07-30 RX ORDER — ALBUTEROL SULFATE 90 UG/1
2 AEROSOL, METERED RESPIRATORY (INHALATION) EVERY 6 HOURS PRN
Qty: 18 G | Refills: 0 | Status: SHIPPED | OUTPATIENT
Start: 2024-07-30

## 2024-07-30 RX ORDER — IPRATROPIUM BROMIDE AND ALBUTEROL SULFATE 2.5; .5 MG/3ML; MG/3ML
3 SOLUTION RESPIRATORY (INHALATION)
Status: DISCONTINUED | OUTPATIENT
Start: 2024-07-30 | End: 2024-07-30

## 2024-07-30 RX ORDER — MAGNESIUM SULFATE HEPTAHYDRATE 40 MG/ML
2 INJECTION, SOLUTION INTRAVENOUS ONCE
Status: COMPLETED | OUTPATIENT
Start: 2024-07-30 | End: 2024-07-30

## 2024-07-30 RX ORDER — ONDANSETRON 4 MG/1
4 TABLET, ORALLY DISINTEGRATING ORAL EVERY 6 HOURS PRN
Status: DISCONTINUED | OUTPATIENT
Start: 2024-07-30 | End: 2024-07-30 | Stop reason: HOSPADM

## 2024-07-30 RX ORDER — MONTELUKAST SODIUM 10 MG/1
10 TABLET ORAL AT BEDTIME
Status: DISCONTINUED | OUTPATIENT
Start: 2024-07-30 | End: 2024-07-30 | Stop reason: HOSPADM

## 2024-07-30 RX ORDER — METHYLPREDNISOLONE SODIUM SUCCINATE 125 MG/2ML
125 INJECTION, POWDER, LYOPHILIZED, FOR SOLUTION INTRAMUSCULAR; INTRAVENOUS ONCE
Status: COMPLETED | OUTPATIENT
Start: 2024-07-30 | End: 2024-07-30

## 2024-07-30 RX ORDER — DIPHENHYDRAMINE HCL 25 MG
50 CAPSULE ORAL EVERY 6 HOURS PRN
Status: DISCONTINUED | OUTPATIENT
Start: 2024-07-30 | End: 2024-07-30 | Stop reason: HOSPADM

## 2024-07-30 RX ORDER — ALBUTEROL SULFATE 0.83 MG/ML
2.5 SOLUTION RESPIRATORY (INHALATION) EVERY 6 HOURS PRN
Qty: 90 ML | Refills: 0 | Status: SHIPPED | OUTPATIENT
Start: 2024-07-30

## 2024-07-30 RX ORDER — METHYLPREDNISOLONE 4 MG
TABLET, DOSE PACK ORAL
Qty: 21 TABLET | Refills: 0 | Status: SHIPPED | OUTPATIENT
Start: 2024-07-30

## 2024-07-30 RX ORDER — ALBUTEROL SULFATE 90 UG/1
2 AEROSOL, METERED RESPIRATORY (INHALATION) EVERY 4 HOURS
Status: DISCONTINUED | OUTPATIENT
Start: 2024-07-30 | End: 2024-07-30 | Stop reason: HOSPADM

## 2024-07-30 RX ADMIN — IPRATROPIUM BROMIDE AND ALBUTEROL SULFATE 3 ML: .5; 3 SOLUTION RESPIRATORY (INHALATION) at 02:45

## 2024-07-30 RX ADMIN — IPRATROPIUM BROMIDE AND ALBUTEROL SULFATE 3 ML: .5; 3 SOLUTION RESPIRATORY (INHALATION) at 02:30

## 2024-07-30 RX ADMIN — METHYLPREDNISOLONE SODIUM SUCCINATE 125 MG: 125 INJECTION, POWDER, FOR SOLUTION INTRAMUSCULAR; INTRAVENOUS at 02:49

## 2024-07-30 RX ADMIN — MAGNESIUM SULFATE HEPTAHYDRATE 2 G: 40 INJECTION, SOLUTION INTRAVENOUS at 02:45

## 2024-07-30 RX ADMIN — ALBUTEROL SULFATE 2 PUFF: 90 AEROSOL, METERED RESPIRATORY (INHALATION) at 08:49

## 2024-07-30 RX ADMIN — ELETRIPTAN HYDROBROMIDE 40 MG: 20 TABLET, FILM COATED ORAL at 09:54

## 2024-07-30 ASSESSMENT — ACTIVITIES OF DAILY LIVING (ADL)
ADLS_ACUITY_SCORE: 36
ADLS_ACUITY_SCORE: 35
ADLS_ACUITY_SCORE: 36
ADLS_ACUITY_SCORE: 36
ADLS_ACUITY_SCORE: 35
ADLS_ACUITY_SCORE: 36

## 2024-07-30 NOTE — H&P
[facility]    History and Physical - Hospitalist Service       Date of Admission:  7/30/2024    Assessment & Plan      Nunu Martines is a 58 year old female admitted on 7/30/2024. She has PMH notable for intermittent asthma, osteoporosis, migraines that presents with acute hypoxic respiratory failure secondary to asthma exacerbation in the setting of COVID pneumonia.  Her hypoxic respiratory failure has since resolved.    Asthma exacerbation  Severe COVID-pneumonia  Acute hypoxic respiratory failure, now resolved  The patient presented in respiratory distress.  She was hypoxic and was placed on BiPAP.  Magnesium sulfate was given in the ED. She quickly weaned to room air and has been off BiPAP for 2 hours at the time of admission.  At the time of admission she still feels that her work of breathing is increased from baseline.  No signs of pneumonia.  CXR with atelectasis but otherwise clear.  -S/p BiPAP in the ED now weaned to room air  -Given 2 g mag sulfate  -S/p 125 mg Solu-Medrol in ED, the patient is not interested in prednisone or dexamethasone due to concerns of osteoporosis.  We discussed the fact that a short course of steroids will likely not worsen her osteoporosis however she still declined prednisone.  She states she would tolerate a Medrol Dosepak which can be started tomorrow. Has not been ordered as of now.   -Scheduled albuterol inhaler 2 puffs Q4H and every 2 hours as needed  -Discussed remdesivir and Paxlovid, the patient declines these medications  -Hopefully the patient can discharge later today if remaining on room air  -Given the severity of her presentation and the fact has been off BiPAP for 2 hours will recheck a venous blood gas  -No signs of bacterial pneumonia therefore do not see indication for antimicrobials at this time    Chest pressure  The patient complains of substernal chest pressure that began when she woke up in respiratory distress.  This is  "likely related to her asthma exacerbation with anxiety playing a role,  however she states the chest pressure is more severe than what is typical when she has an asthma exacerbation.  As such will check EKG, add on troponin to ER labs and repeat troponin now.  The description of her pain is not typical cardiac chest pain. It is also not consistent with pericarditis.  There is no pneumothorax noted on chest x-ray.  -check EKG, add on trop to ER labs and repeat trop        Observation Goals: -diagnostic tests and consults completed and resulted, -vital signs normal or at patient baseline, Nurse to notify provider when observation goals have been met and patient is ready for discharge.  Diet:  Regular diet  DVT Prophylaxis: Low risk, ambulate as the patient will likely DC later on today.  Hung Catheter: Not present  Lines: None     Cardiac Monitoring: None  Code Status: Full Code      Clinically Significant Risk Factors Present on Admission          Disposition Plan     Medically Ready for Discharge: Anticipated Today       Jonathan Calvo MD  Hospitalist Service  [facility]  Securely message with [employer] (more info)  Text page via [employer] Paging/Directory     ______________________________________________________________________    Chief Complaint   \"This came on quick.\"    History is obtained from the patient    History of Present Illness   Nunu Martines is a 58 year old lady has history of intermittent asthma, osteoporosis, migraines that presents with difficulties breathing.     Her  has been sick with URI.  Last night she had a dry cough.  This morning she woke up at 2 AM with respiratory distress.  She took 2 puffs of her inhaler and this did not improve her breathing so she presented to the emergency department.  She states that she has ongoing chest pressure that started when she woke up this morning.  The chest pressure is more severe than her usual anxiety or asthma " symptoms.  The pain does not radiate to her jaw arm or back.  However despite her breathing improving the chest pressure remains.  No nausea, vomiting, diarrhea, abdominal pain, urinary complaints.   With regards to her asthma she rarely has exacerbations.  She has never been hospitalized for asthma exacerbation.  She feels that she usually has good control of her asthma with her albuterol inhaler.    On arrival to the ED the patient was noted to be in respiratory distress.  She was placed on BiPAP and given 125 mg Solu-Medrol, magnesium sulfate, and DuoNebs.  CXR with mild atelectasis in lung bases but otherwise lungs clear. Covid swab was positive. She quickly weaned to room air and has been off of BiPAP for 2 hours of time admission.     During interview she stated she still felt like her work of breathing was mildly increased from baseline.  She used to be on Synthroid for hypothyroidism but this has since been discontinued.  She is prescribed hydrochlorothiazide but she only takes this as needed for edema.  She is not taking medications that are scheduled every day.  All medications are as needed.      Past Medical History    No past medical history on file.  Varicose veins  Panic disorder  Insomnia  Asthma  Allergic rhinitis  Constipation  Hypothyroidism  Uninodular goiter  Lumbago  Migraine  Temporomandibular joint disorder  Fibromyalgia   Degenerative disk disorder  Chronic bilateral low back pain    Past Surgical History   No past surgical history on file.    Prior to Admission Medications   Prior to Admission Medications   Prescriptions Last Dose Informant Patient Reported? Taking?   ALBUTEROL IN   Yes No   Sig: Inhale 1-2 puffs into the lungs   cetirizine (ZYRTEC ALLERGY) 10 MG tablet   Yes No   Sig: Take 1 tablet by mouth as needed   diazepam (VALIUM) 10 MG tablet   Yes No   Sig: TAKE 1 TABLET BY MOUTH EVERY 8 HOURS AS NEEDED   eletriptan (RELPAX) 40 MG tablet   Yes No   Sig: as needed.    hydrochlorothiazide (HYDRODIURIL) 25 MG tablet   Yes No   Sig: TAKE 1 (ONE) TABLET DAILY IN THE MORNING AS NEEDED   levothyroxine (SYNTHROID/LEVOTHROID) 88 MCG tablet   Yes No   Sig: TAKE 1 TABLET (88 MCG) BY MOUTH ONCE DAILY FOR 90 DAYS   methylPREDNISolone (MEDROL) 4 MG tablet   Yes No   Sig: TAKE 1 TABLET BY MOUTH TWICE A DAY   montelukast (SINGULAIR) 10 MG tablet   Yes No   Sig: TAKE 1 TABLET BY MOUTH ONCE EVERY EVENING.   ondansetron (ZOFRAN) 4 MG tablet   Yes No   Sig: TAKE ONE TABLET BY MOUTH UP TO THREE TIMES PER DAY AS NEEDED   traZODone (DESYREL) 50 MG tablet   Yes No   Si-50 MG per patient      Facility-Administered Medications: None        Review of Systems    The 10 point Review of Systems is negative other than noted in the HPI or here.     Social History   I have reviewed this patient's social history and updated it with pertinent information if needed.  Social History     Tobacco Use    Smoking status: Never    Smokeless tobacco: Never     She does not smoke or use dipping tobacco.  She occasionally drinks alcohol.  No illicit substance use.    Family History     Noncontributory      Allergies   Allergies   Allergen Reactions    Paroxetine Anaphylaxis, Anxiety, Unknown, Dizziness, Nausea, Other (See Comments), Palpitations and Photosensitivity     Other reaction(s): *Unknown, AGITATION, MENTAL STATUS CHANGES, SWEATING  Serotonin syndrome      Penicillins Anaphylaxis and Rash     PN: LW Reaction: SYNCOPE      Hydrocodone-Acetaminophen Itching and Nausea and Vomiting     Other reaction(s): GI intolerance  PN: LW Reaction: Itching, Pruritis      Nsaids Other (See Comments)     PN: LW Reaction: EDEMA, GENERALIZED    Sulfa Antibiotics Nausea and Vomiting and Other (See Comments)        Physical Exam   Vital Signs: Temp: 97.3  F (36.3  C)   BP: (!) 162/87 Pulse: 85   Resp: 17 SpO2: 95 % O2 Device: BiPAP/CPAP    Weight: 0 lbs 0 oz    GENERAL:sitting up in bed, appears mildly anxious, can complete all  of her sentences  HEENT: NC/AT, sclera anicteric   CV: RRR, no M/R/G, CR < 2 s   PULM: Decreased tidal volume, faint end expiratory wheezes in all lung fields, normal work of breathing   GI: ND  MSK: WWP, mild LE edema  NEURO: Awake, alert, oriented to 7/30/2024, CN II-XII grossly intact, ANDERSEN, appears nonfocal  SKIN: no rash           Medical Decision Making       85 MINUTES SPENT BY ME on the date of service doing chart review, history, exam, documentation & further activities per the note.      Data     I have personally reviewed the following data over the past 24 hrs:    10.4  \   14.5   / 255     142 103 13.4 /  103 (H)   3.4 23 0.75 \     Trop: 9 BNP: N/A       Imaging results reviewed over the past 24 hrs:   Recent Results (from the past 24 hour(s))   XR Chest Port 1 View    Narrative    EXAM: XR CHEST PORT 1 VIEW  LOCATION: [facility]  DATE: 7/30/2024    INDICATION: SOB, asthma exacerbation  COMPARISON: None.      Impression    IMPRESSION: Mild atelectasis in the lung bases. Lungs otherwise clear. No pleural effusion or pneumothorax. Normal heart size and pulmonary vascularity.

## 2024-07-30 NOTE — PLAN OF CARE
Discharged home with spouse assisting and transporting as needed, belongings sent along script for new medications sent to pharmacy of choice. Pt and family did not have any question or concerns noted upon departure.

## 2024-07-30 NOTE — DISCHARGE INSTRUCTIONS
Follow up with primary care provider, Marcela Brown, within 7 days for hospital follow- up.  No follow up labs or test are needed.    Recommend you monitor for signs of wheezing or shortness of breath.  You could use your albuterol inhaler if you are having persistent coughing.  The nebulizer may be more beneficial if you are having persistent coughing or difficulty breathing.  If your albuterol inhaler or nebulizer are not helping, recommend you return to the emergency department for evaluation.    You may benefit from following up with your allergist regarding your asthma exacerbation.  This was likely caused by the COVID infection.    Recommend you isolate/quarantine yourself until symptom free or fever free for at least 24 hours.

## 2024-07-30 NOTE — ED TRIAGE NOTES
Pt comes in with family, has had a cough for the past few days, tonight started having severe SOB and wheezing. Has had nebulizers before, hx asthma, no neb taken tonight.

## 2024-07-30 NOTE — DISCHARGE SUMMARY
Hospitalist Discharge Summary  [facility]    Nunu Martines MRN# [id number]   YOB: 1965 Age: 58 year old     Date of Admission:  7/30/2024  Date of Discharge:  7/30/2024  Admitting Physician:  Jonathan Calvo MD  Discharge Physician:  Gregory Martinez DO  Discharging Service:  Hospitalist     Primary Provider: Marcela Brown          Discharge Diagnosis:     Acute Asthma Exacerbation  Covid 19 Infection  - Improved with short time on bipap  - Pt intolerant to prednisone.  Ok with dexamethasone  - Start azithromycin at discharge  - Continue montelukast     Lactic Acidosis  - Likely secondary to respiratory distress.  VSS and pt breathing better.  No plan for repeat check     Chronic Medical Problems:  Multiple Allergies  Fibromyalgia  Osteoporosis  Chronic Pain  Migraines             Discharge Disposition:     Discharged to home           Hospital Course:     Nunu Martines is a 58 year old female with a history of asthma, multiple allergies, fibromyalgia, osteoporosis, chronic pain, migraines admitted on 7/30/2024 with shortness of breath.  In the emergency department, the patient was found to have a temperature of 97.3  F, blood pressure 162/87, heart rate 85, respiratory rate 17, SpO2 95% on continuous BiPAP.  Initial lab work showed anion gap 16, lactic acid 4.6, venous pH 7.46 with a venous pCO2 of 35, WBC 10.4.  Patient tested positive for COVID-19.  Chest x-ray showed mild atelectasis in the lung bases.  The patient did require continuous BiPAP upon arrival, but was quickly titrated off.  She received a dose of IV Solu-Medrol and IV magnesium with some improvement in her symptoms.  At the afternoon of 7/30, the patient's symptoms had improved to the point that she was stable for discharge home to follow-up with her primary care doctor as an outpatient.     The patient was seen, examined, and counseled on this day. The hospitalization and plan of care  "was reviewed with the patient extensively. All questions were addressed and the patient agreed to follow-up as noted above.           Allergies:     Allergies   Allergen Reactions    Paroxetine Anaphylaxis, Anxiety, Unknown, Dizziness, Nausea, Other (See Comments), Palpitations and Photosensitivity     Other reaction(s): *Unknown, AGITATION, MENTAL STATUS CHANGES, SWEATING  Serotonin syndrome      Penicillins Anaphylaxis and Rash     PN: LW Reaction: SYNCOPE      Hydrocodone-Acetaminophen Nausea and Vomiting and Itching     Other reaction(s): GI intolerance  PN: LW Reaction: Itching, Pruritus  Patient is OK with acetaminophen.      Nsaids Other (See Comments)     PN: LW Reaction: EDEMA, GENERALIZED    Sulfa Antibiotics Other (See Comments) and Nausea and Vomiting     Extremely red eyes, like \"vampire\".              Discharge Medications:     Current Discharge Medication List        START taking these medications    Details   albuterol (PROAIR HFA/PROVENTIL HFA/VENTOLIN HFA) 108 (90 Base) MCG/ACT inhaler Inhale 2 puffs into the lungs every 6 hours as needed for shortness of breath, wheezing or cough  Qty: 18 g, Refills: 0    Comments: Pharmacy may dispense brand covered by insurance (Proair, or proventil or ventolin or generic albuterol inhaler)  Associated Diagnoses: Mild intermittent asthma with exacerbation      albuterol (PROVENTIL) (2.5 MG/3ML) 0.083% neb solution Take 1 vial (2.5 mg) by nebulization every 6 hours as needed for shortness of breath, wheezing or cough  Qty: 90 mL, Refills: 0    Associated Diagnoses: Mild intermittent asthma with exacerbation      azithromycin (ZITHROMAX) 250 MG tablet Take 2 tablets (500 mg) by mouth daily for 1 day, THEN 1 tablet (250 mg) daily for 4 days.  Qty: 6 tablet, Refills: 0    Associated Diagnoses: Mild intermittent asthma with exacerbation; Acute respiratory failure, unspecified whether with hypoxia or hypercapnia (H)      benzonatate (TESSALON) 100 MG capsule Take 1 " capsule (100 mg) by mouth 3 times daily as needed for cough  Qty: 21 capsule, Refills: 0    Associated Diagnoses: Mild intermittent asthma with exacerbation      methylPREDNISolone (MEDROL DOSEPAK) 4 MG tablet therapy pack Follow Package Directions  Qty: 21 tablet, Refills: 0    Associated Diagnoses: Mild intermittent asthma with exacerbation; Acute respiratory failure, unspecified whether with hypoxia or hypercapnia (H)           CONTINUE these medications which have NOT CHANGED    Details   cetirizine (ZYRTEC ALLERGY) 10 MG tablet Take 1 tablet by mouth as needed      clindamycin (CLEOCIN T) 1 % external lotion Apply topically 2 times daily as needed (acne)      diazepam (VALIUM) 10 MG tablet TAKE 1 TABLET BY MOUTH EVERY 8 HOURS AS NEEDED  Refills: 0      eletriptan (RELPAX) 40 MG tablet as needed.      hydrochlorothiazide (HYDRODIURIL) 25 MG tablet TAKE 1 (ONE) TABLET DAILY IN THE MORNING AS NEEDED      hydrocortisone 2.5 % cream Apply topically 2 times daily as needed for rash      montelukast (SINGULAIR) 10 MG tablet TAKE 1 TABLET BY MOUTH ONCE EVERY EVENING.      ondansetron (ZOFRAN) 4 MG tablet TAKE ONE TABLET BY MOUTH UP TO THREE TIMES PER DAY AS NEEDED  Refills: 3      traZODone (DESYREL) 50 MG tablet Take 25-50 mg by mouth nightly as needed for sleep 25-50 MG per patient  Refills: 0           STOP taking these medications       ALBUTEROL IN Comments:   Reason for Stopping:                      Condition on Discharge:     Discharge condition: Fair   Discharge vitals: Blood pressure (!) 180/102, pulse 86, temperature 97.7  F (36.5  C), temperature source Axillary, resp. rate 22, SpO2 93%.   Code status on discharge: Full Code      BASIC PHYSICAL EXAMINATION:  GENERAL: No apparent distress.  CARDIOVASCULAR: Regular rate and rhythm without murmurs.  PULMONARY: Clear to auscultation bilaterally.   GASTROINTESTINAL: Abdomen soft, non-tender.  EXTREMITIES: No edema, pulses intact.  NEUROLOGIC: No focal deficits.             History of Illness:   See detailed admission note for full details.               Procedures excluding imaging which is summarized below:     Please see details in the electronic medical record.           Consultations:     None          Significant Results:     Results for orders placed or performed during the hospital encounter of 07/30/24   XR Chest Port 1 View    Narrative    EXAM: XR CHEST PORT 1 VIEW  LOCATION: [facility]  DATE: 7/30/2024    INDICATION: SOB, asthma exacerbation  COMPARISON: None.      Impression    IMPRESSION: Mild atelectasis in the lung bases. Lungs otherwise clear. No pleural effusion or pneumothorax. Normal heart size and pulmonary vascularity.       Transthoracic Echocardiogram Results:  No results found for this or any previous visit (from the past 4320 hour(s)).             Pending Results:     Unresulted Labs Ordered in the Past 30 Days of this Admission       No orders found from 6/30/2024 to 7/31/2024.                        Discharge Instructions and Follow-Up:     Discharge instructions and follow-up:   Discharge Procedure Orders   Reason for your hospital stay   Order Comments: Acute Asthma Exacerbation, Covid 19 Infection     Follow-up and recommended labs and tests    Order Comments: Follow up with primary care provider, Marcela Brown, within 7 days for hospital follow- up.  No follow up labs or test are needed.    Recommend you monitor for signs of wheezing or shortness of breath.  You could use your albuterol inhaler if you are having persistent coughing.  The nebulizer may be more beneficial if you are having persistent coughing or difficulty breathing.  If your albuterol inhaler or nebulizer are not helping, recommend you return to the emergency department for evaluation.    You may benefit from following up with your allergist regarding your asthma exacerbation.  This was likely caused by the COVID infection.    Recommend you  isolate/quarantine yourself until symptom free or fever free for at least 24 hours.     Activity   Order Comments: Your activity upon discharge: activity as tolerated     Order Specific Question Answer Comments   Is discharge order? Yes      Diet   Order Comments: Follow this diet upon discharge: Orders Placed This Encounter      Regular Diet Adult     Order Specific Question Answer Comments   Is discharge order? Yes           Total time spent in face to face contact with the patient and coordinating discharge was:  33 Minutes    Gregory Martinez DO  [facility] Hospitalist  [address] [place] [address]  07/30/2024

## 2024-07-30 NOTE — ED PROVIDER NOTES
Emergency Department Note      History of Present Illness     Chief Complaint   Shortness of Breath      HPI   Nunu Martines is a 58 year old female with a history of asthma who presents with her  for a complaint of shortness of breath. The patient's  reports that both he and the patient have had a cough for a few days. Earlier tonight, he walked into the room the patient was in and witnessed her having increased difficulty breathing and wheezing. The patient denies any chest pain or fevers. She has used a nebulizer before but did not tonight. The patient's  notes that she has never been intubated or hospitalized due to asthma.    Independent Historian    as detailed above    Past Medical History   Medical History and Problem List   Varicose veins  Panic disorder  Insomnia  Asthma  Allergic rhinitis  Constipation  Hypothyroidism  Uninodular goiter  Lumbago  Migraine  Temporomandibular joint disorder  Fibromyalgia   Degenerative disk disorder  Chronic bilateral low back pain    Medications   Lorazepam  Eletriptan  Albuterol  Ondansetron  Alprazolam  Montelukast  Synthyroid  Cyclobenzaprine  Olopatadine  Phentermine  Diazepam  Methocarbamol  Naltrexone  Hydrochlorothiazide  Phentermine    Surgical History   Tonsillectomy   Knee surgery   Physical Exam     Patient Vitals for the past 24 hrs:   BP Temp Pulse Resp SpO2   07/30/24 0500 (!) 162/87 -- 85 17 95 %   07/30/24 0315 (!) 159/88 -- 89 29 --   07/30/24 0305 -- -- 92 24 --   07/30/24 0300 (!) 165/88 -- 97 26 100 %   07/30/24 0249 -- -- 107 (!) 34 99 %   07/30/24 0245 (!) 191/112 -- 101 13 100 %   07/30/24 0239 (!) 191/112 97.3  F (36.3  C) 104 (!) 38 99 %     Physical Exam  General: Patient appears to be in acute distress  CV: Tachycardic but regular  Resp: severe respiratory distress.  Inspiratory and expiratory wheezes. Prolonged expiratory phase. No rales noted. No asymmetry to breath sounds.   GI: Abdomen is soft, no  rigidity, guarding, or rebound. No distension. No tenderness to palpation in any quadrant.    MS: Normal tone. Joints grossly normal without effusions. No asymmetric leg swelling, calf or thigh tenderness.    Skin: No rash or lesions noted. Normal capillary refill noted  Psych: Quite anxious.     Diagnostics     Lab Results   Labs Ordered and Resulted from Time of ED Arrival to Time of ED Departure   BASIC METABOLIC PANEL - Abnormal       Result Value    Sodium 142      Potassium 3.4      Chloride 103      Carbon Dioxide (CO2) 23      Anion Gap 16 (*)     Urea Nitrogen 13.4      Creatinine 0.75      GFR Estimate >90      Calcium 9.6      Glucose 103 (*)    BLOOD GAS VENOUS - Abnormal    pH Venous 7.46 (*)     pCO2 Venous 35 (*)     pO2 Venous 55 (*)     Bicarbonate Venous 24      Base Excess/Deficit Venous 0.8      FIO2 40      Oxyhemoglobin Venous 89 (*)     O2 Sat, Venous 90.0 (*)    INFLUENZA A/B, RSV, & SARS-COV2 PCR - Abnormal    Influenza A PCR Negative      Influenza B PCR Negative      RSV PCR Negative      SARS CoV2 PCR Positive (*)    TROPONIN T, HIGH SENSITIVITY - Normal    Troponin T, High Sensitivity 9     HCG QUALITATIVE PREGNANCY - Normal    hCG Serum Qualitative Negative     CBC WITH PLATELETS AND DIFFERENTIAL    WBC Count 10.4      RBC Count 4.70      Hemoglobin 14.5      Hematocrit 44.1      MCV 94      MCH 30.9      MCHC 32.9      RDW 12.4      Platelet Count 255      % Neutrophils 60      % Lymphocytes 30      % Monocytes 8      % Eosinophils 2      % Basophils 0      % Immature Granulocytes 0      NRBCs per 100 WBC 0      Absolute Neutrophils 6.2      Absolute Lymphocytes 3.1      Absolute Monocytes 0.8      Absolute Eosinophils 0.2      Absolute Basophils 0.0      Absolute Immature Granulocytes 0.0      Absolute NRBCs 0.0     COMPREHENSIVE METABOLIC PANEL   CBC WITH PLATELETS   BLOOD GAS VENOUS   TROPONIN T, HIGH SENSITIVITY       Imaging   XR Chest Port 1 View   Final Result   IMPRESSION: Mild  atelectasis in the lung bases. Lungs otherwise clear. No pleural effusion or pneumothorax. Normal heart size and pulmonary vascularity.          EKG   None    Independent Interpretation   Chest x-ray shows no signs of pneumonia  ED Course      Medications Administered   Medications   senna-docusate (SENOKOT-S/PERICOLACE) 8.6-50 MG per tablet 1 tablet (has no administration in time range)     Or   senna-docusate (SENOKOT-S/PERICOLACE) 8.6-50 MG per tablet 2 tablet (has no administration in time range)   ondansetron (ZOFRAN ODT) ODT tab 4 mg (has no administration in time range)     Or   ondansetron (ZOFRAN) injection 4 mg (has no administration in time range)   albuterol (PROVENTIL HFA/VENTOLIN HFA) inhaler (has no administration in time range)   albuterol (PROVENTIL HFA/VENTOLIN HFA) inhaler (has no administration in time range)   methylPREDNISolone sodium succinate (solu-MEDROL) injection 125 mg (125 mg Intravenous $Given 7/30/24 0249)   magnesium sulfate 2 g in 50 mL sterile water intermittent infusion (0 g Intravenous Stopped 7/30/24 0408)       Procedures   None     Discussion of Management   0454 I spoke to Dr Calvo, admitting hospitalist.    ED Course   ED Course as of 07/30/24 0750   Tue Jul 30, 2024   0230 I initially assessed the patient and obtained the history and physical exam.    0307 I rechecked and updated the patient. She is doing better better on BiPap.   0450 I rechecked and updated the patient. She has improved and is off BiPap.       Optional/Additional Documentation  None  Medical Decision Making / Diagnosis     CMS Diagnoses: None    MIPS       None    MDM   Nunu GILMA Rita Martines is a 58 year old female with past medical history of asthma who presents to the emergency department with acute respiratory failure.  Upon initial evaluation she is tachypneic with diffuse wheezing in multiple lung fields and significant increased work of breathing.  Patient was placed on BiPAP and treated with  multiple nebulizers, steroids and magnesium.  She had significant improvement of her symptoms and was able to be weaned off BiPAP.  On reevaluation she feels improved but continues to have chest tightness.  Chest x-ray was negative for pneumonia, pneumothorax, widened mediastinum, pleural effusion or pulmonary edema.  Unfortunately COVID testing came back positive which is likely the nidus of her significant asthma exacerbation.  Patient will be admitted for observation to ensure she continues to improve.  Discussed Paxlovid but patient politely declined.    Disposition   The patient was admitted to the hospital.     Diagnosis     ICD-10-CM    1. Mild intermittent asthma with exacerbation  J45.21       2. COVID-19 virus infection  U07.1       3. Acute respiratory failure, unspecified whether with hypoxia or hypercapnia (H)  J96.00              Scribe Disclosure:  Neha ANGELA, am serving as a scribe at 2:36 AM on 7/30/2024 to document services personally performed by Kvng Bustillo MD based on my observations and the provider's statements to me.        Kvng Bustillo MD  07/30/24 0759

## 2024-07-30 NOTE — PROGRESS NOTES
A BiPAP of  16/8 @ 60% was applied to the pt via the mask for an increase in WOB and/or SOB.  The bridge of the nose looks good and remains intact. Pt is tolerating it well. Will continue to monitor and assess the pt's current respiratory status and needs.    Teena Ellison, RT on 7/30/2024 at 3:12 AM

## 2024-07-30 NOTE — ED NOTES
[facility]  ED Nurse Handoff Report    ED Chief complaint: Shortness of Breath  . ED Diagnosis:   Final diagnoses:   Mild intermittent asthma with exacerbation   COVID-19 virus infection   Acute respiratory failure, unspecified whether with hypoxia or hypercapnia (H)       Allergies:   Allergies   Allergen Reactions    Paroxetine Anaphylaxis, Anxiety, Unknown, Dizziness, Nausea, Other (See Comments), Palpitations and Photosensitivity     Other reaction(s): *Unknown, AGITATION, MENTAL STATUS CHANGES, SWEATING  Serotonin syndrome      Penicillins Anaphylaxis and Rash     PN: LW Reaction: SYNCOPE      Hydrocodone-Acetaminophen Itching and Nausea and Vomiting     Other reaction(s): GI intolerance  PN: LW Reaction: Itching, Pruritis      Nsaids Other (See Comments)     PN: LW Reaction: EDEMA, GENERALIZED    Sulfa Antibiotics Nausea and Vomiting and Other (See Comments)       Code Status: Full Code    Activity level - Baseline/Home:  independent.  Activity Level - Current:   independent.   Lift room needed: No.   Bariatric: No   Needed: No   Isolation: Yes.   Infection: COVID r/o and special precautions.     Respiratory status: Room air    Vital Signs (within 30 minutes):   Vitals:    07/30/24 0249 07/30/24 0300 07/30/24 0305 07/30/24 0315   BP:  (!) 165/88  (!) 159/88   Pulse: 107 97 92 89   Resp: (!) 34 26 24 29   Temp:       SpO2: 99% 100%         Cardiac Rhythm:  ,      Pain level:    Patient confused: No.   Patient Falls Risk: patient and family education.   Elimination Status:    Has voided  Patient Report - Initial Complaint: Shortness of Breath .   Focused Assessment: Nunu Martines is a 58 year old female with a history of asthma who presents with her  for a complaint of shortness of breath. The patient's  reports that both he and the patient have had a cough for a few days. Earlier tonight, he walked into the room the patient was in and witnessed her having  increased difficulty breathing and wheezing. The patient denies any chest pain or fevers. She has used a nebulizer before but did not tonight. The patient's  notes that she has never been intubated or hospitalized due to asthma.      Abnormal Results:   Labs Ordered and Resulted from Time of ED Arrival to Time of ED Departure   BASIC METABOLIC PANEL - Abnormal       Result Value    Sodium 142      Potassium 3.4      Chloride 103      Carbon Dioxide (CO2) 23      Anion Gap 16 (*)     Urea Nitrogen 13.4      Creatinine 0.75      GFR Estimate >90      Calcium 9.6      Glucose 103 (*)    BLOOD GAS VENOUS - Abnormal    pH Venous 7.46 (*)     pCO2 Venous 35 (*)     pO2 Venous 55 (*)     Bicarbonate Venous 24      Base Excess/Deficit Venous 0.8      FIO2 40      Oxyhemoglobin Venous 89 (*)     O2 Sat, Venous 90.0 (*)    INFLUENZA A/B, RSV, & SARS-COV2 PCR - Abnormal    Influenza A PCR Negative      Influenza B PCR Negative      RSV PCR Negative      SARS CoV2 PCR Positive (*)    CBC WITH PLATELETS AND DIFFERENTIAL    WBC Count 10.4      RBC Count 4.70      Hemoglobin 14.5      Hematocrit 44.1      MCV 94      MCH 30.9      MCHC 32.9      RDW 12.4      Platelet Count 255      % Neutrophils 60      % Lymphocytes 30      % Monocytes 8      % Eosinophils 2      % Basophils 0      % Immature Granulocytes 0      NRBCs per 100 WBC 0      Absolute Neutrophils 6.2      Absolute Lymphocytes 3.1      Absolute Monocytes 0.8      Absolute Eosinophils 0.2      Absolute Basophils 0.0      Absolute Immature Granulocytes 0.0      Absolute NRBCs 0.0          XR Chest Port 1 View   Final Result   IMPRESSION: Mild atelectasis in the lung bases. Lungs otherwise clear. No pleural effusion or pneumothorax. Normal heart size and pulmonary vascularity.          Treatments provided: see MAR  Family Comments:  at bedside  OBS brochure/video discussed/provided to patient:  Yes  ED Medications:   Medications   ipratropium - albuterol 0.5  mg/2.5 mg/3 mL (DUONEB) neb solution 3 mL (3 mLs Nebulization $Given 7/30/24 0245)   methylPREDNISolone sodium succinate (solu-MEDROL) injection 125 mg (125 mg Intravenous $Given 7/30/24 0249)   magnesium sulfate 2 g in 50 mL sterile water intermittent infusion (0 g Intravenous Stopped 7/30/24 0408)       Drips infusing:  No  For the majority of the shift this patient was Green.   Interventions performed were n/a.    Sepsis treatment initiated: No    Cares/treatment/interventions/medications to be completed following ED care:      ED Nurse Name: Yi Cabrera RN  5:06 AM

## 2024-07-30 NOTE — PROGRESS NOTES
[facility]    Hospitalist Progress Note  Name: Nunu Martines    MRN: [id number]  Provider:  Gregory Martinez DO  Date of Service: 07/30/2024    Summary of Stay: Nunu Martines is a 58 year old female with a history of asthma, multiple allergies, fibromyalgia, osteoporosis, chronic pain, migraines admitted on 7/30/2024 with shortness of breath.  In the emergency department, the patient was found to have a temperature of 97.3  F, blood pressure 162/87, heart rate 85, respiratory rate 17, SpO2 95% on continuous BiPAP.  Initial lab work showed anion gap 16, lactic acid 4.6, venous pH 7.46 with a venous pCO2 of 35, WBC 10.4.  Patient tested positive for COVID-19.  Chest x-ray showed mild atelectasis in the lung bases.  The patient did require continuous BiPAP upon arrival, but was quickly titrated off.  She received a dose of IV Solu-Medrol and IV magnesium with some improvement in her symptoms.  At the afternoon of 7/30, the patient's symptoms had improved to the point that she was stable for discharge home to follow-up with her primary care doctor as an outpatient.    TODAY'S PLAN: Patient seen and examined in the emergency department.  Patient still has headache she describes as her migraine.  She states she feels like coughing but is afraid to cough as she is concerned that will throw her into another exacerbation.  We discussed her chest x-ray results and lab results.  Given the atelectasis on the chest x-ray, this could be concerning for developing pneumonia so I believe a Z-Jesus would be appropriate.  Will plan for Tessalon Perles, Medrol Dosepak, and refill of her albuterol nebulizer vials.  She states she has a machine at home and is fairly sure it works.  Will also send over albuterol inhaler so she has that available, if needed.  Recommended she follow-up with her primary care doctor later this week.   at bedside.  All questions answered.  Discharge home  today.    Problem List:   Acute Asthma Exacerbation  Covid 19 Infection  - Improved with short time on bipap  - Pt intolerant to prednisone.  Ok with dexamethasone  - Start azithromycin at discharge  - Continue montelukast    Lactic Acidosis  - Likely secondary to respiratory distress.  VSS and pt breathing better.  No plan for repeat check    Chronic Medical Problems:  Multiple Allergies  Fibromyalgia  Osteoporosis  Chronic Pain  Migraines    I spent 45 minutes in reviewing this patient's labs, imaging, medications, medical history.  In addition time was spent interviewing the patient, communicating with family, and medical decision making.  Time was also spent reviewing notes of endocrinology.    DVT Prophylaxis: Pneumatic Compression Devices  Code Status: Full Code  Diet: Regular Diet Adult    Hung Catheter: Not present    Disposition: Medically Ready for Discharge: Ready Now  Goals to discharge include: symptoms improved  Family updated today: Yes      Interval History   Pt seen and examined.  Pt reports some improvement in her breathing, but still having issues feeling like she needs to cough.    -Data reviewed today: I personally reviewed all new labs and imaging results over the last 24 hours.     Physical Exam   Temp: 97.7  F (36.5  C) Temp src: Axillary BP: (!) 172/90 Pulse: 87   Resp: 22 SpO2: 93 % O2 Device: None (Room air)    There were no vitals filed for this visit.  Vital Signs with Ranges  Temp:  [97.3  F (36.3  C)-98.1  F (36.7  C)] 97.7  F (36.5  C)  Pulse:  [] 87  Resp:  [13-38] 22  BP: (159-191)/() 172/90  FiO2 (%):  [40 %-60 %] 40 %  SpO2:  [93 %-100 %] 93 %  No intake/output data recorded.    GENERAL: No apparent distress. Awake, alert, and fully oriented.  HEENT: Normocephalic, atraumatic. Extraocular movements intact.  CARDIOVASCULAR: Regular rate and rhythm without murmurs or rubs. No S3.  PULMONARY: Rhonchi R base  GASTROINTESTINAL: Soft, non-tender, non-distended. Bowel sounds  "normoactive.   EXTREMITIES: No cyanosis or clubbing. No edema.  NEUROLOGICAL: CN 2-12 grossly intact, no focal neurological deficits.  DERMATOLOGICAL: No rash, ulcer, bruising, nor jaundice.    Medications   Current Facility-Administered Medications   Medication Dose Route Frequency Provider Last Rate Last Admin     Current Facility-Administered Medications   Medication Dose Route Frequency Provider Last Rate Last Admin    albuterol (PROVENTIL HFA/VENTOLIN HFA) inhaler  2 puff Inhalation Q4H Jonathan Calvo MD   2 puff at 07/30/24 0849    montelukast (SINGULAIR) tablet 10 mg  10 mg Oral At Bedtime Gregory Martinez DO         Data     Laboratory:  Recent Labs   Lab 07/30/24  0954 07/30/24  0243   WBC 8.6 10.4   HGB 14.1 14.5   HCT 42.4 44.1   MCV 92 94    255     Recent Labs   Lab 07/30/24  0954 07/30/24  0243    142   POTASSIUM 3.6 3.4   CHLORIDE 104 103   CO2 18* 23   ANIONGAP 20* 16*   * 103*   BUN 10.9 13.4   CR 0.60 0.75   GFRESTIMATED >90 >90   CHELSY 9.3 9.6     No results for input(s): \"CULT\" in the last 168 hours.  No results found for: \"TROPI\"    Imaging:  Recent Results (from the past 24 hour(s))   XR Chest Port 1 View    Narrative    EXAM: XR CHEST PORT 1 VIEW  LOCATION: [facility]  DATE: 7/30/2024    INDICATION: SOB, asthma exacerbation  COMPARISON: None.      Impression    IMPRESSION: Mild atelectasis in the lung bases. Lungs otherwise clear. No pleural effusion or pneumothorax. Normal heart size and pulmonary vascularity.         Gregory Martinez DO  Formerly [facility] Hospitalist  [address] [place] [address]  Securely message with [employer] (more info)  Text page via [employer] Paging/Directory   07/30/2024   "

## 2024-07-30 NOTE — PHARMACY-ADMISSION MEDICATION HISTORY
Pharmacist Admission Medication History    Admission medication history is complete. The information provided in this note is only as accurate as the sources available at the time of the update.    Information Source(s): Patient via in-person.    Pertinent Information: Patient stated that she has a gel prescribed by dermatologist that she is using prn, but could not remember the name. Does NOT take: levothyroxine, methylprednisolone, phentermine. Hydrochlorothiazide - uses prn, last dose about 2-3 weeks ago, uses couple times per month depending on swelling.    Changes made to PTA medication list:  Added: hydrocortisone cream, clindamycin lotion.  Deleted: levothyroxine (this is week #6 off levothyroxine, will be off for another ONE week before rechecking lab), methylprednisolone.  Changed: none.    Allergies reviewed with patient and updates made in EHR: yes    Medication History Completed By: Agata Taylor [facility] 7/30/2024 8:52 AM    PTA Med List   Medication Sig Last Dose    ALBUTEROL IN Inhale 1-2 puffs into the lungs prn    cetirizine (ZYRTEC ALLERGY) 10 MG tablet Take 1 tablet by mouth as needed prn    clindamycin (CLEOCIN T) 1 % external lotion Apply topically 2 times daily as needed (acne) prn acne    diazepam (VALIUM) 10 MG tablet TAKE 1 TABLET BY MOUTH EVERY 8 HOURS AS NEEDED prn    eletriptan (RELPAX) 40 MG tablet as needed. prn    hydrochlorothiazide (HYDRODIURIL) 25 MG tablet TAKE 1 (ONE) TABLET DAILY IN THE MORNING AS NEEDED prn    hydrocortisone 2.5 % cream Apply topically 2 times daily as needed for rash prn    montelukast (SINGULAIR) 10 MG tablet TAKE 1 TABLET BY MOUTH ONCE EVERY EVENING. prn    ondansetron (ZOFRAN) 4 MG tablet TAKE ONE TABLET BY MOUTH UP TO THREE TIMES PER DAY AS NEEDED prn    traZODone (DESYREL) 50 MG tablet Take 25-50 mg by mouth nightly as needed for sleep 25-50 MG per patient prn

## 2024-07-30 NOTE — PLAN OF CARE
"Some crackles on mid and lower lungs, continually taking deep breaths, reminded to stop to see if she requires oxygen or not. IS provided, unable to use as she says she is worried about having a coughing spell. Pt also saying that she is probably congested as she \"has been eating dairy products lately for her osteoporosis and it that causes her breathing problems\". Rash on her neck, upper chest and shoulders, says she is allergic to a number of things, says she carries cortisone cream 2.5%, but no epi. Rash not severe for now, declined interventions, new order for Benadryl and cortisone cream in place. Pt declined to eat as does not want to eat processed foods and animal products. Lactic 4.6 while running VBG, provider notified, lab order entered for lab to result finding and enter in chart. Will keep monitoring.   "

## 2024-07-30 NOTE — ED NOTES
Patient at triage desk in respiratory distress, brought back to room untriaged. This writer yelena broadcasted to ED and requested duoneb, and for RN and MD to be at bedside.

## 2024-08-10 ENCOUNTER — HEALTH MAINTENANCE LETTER (OUTPATIENT)
Age: 59
End: 2024-08-10

## 2025-03-22 ENCOUNTER — ANCILLARY PROCEDURE (OUTPATIENT)
Dept: GENERAL RADIOLOGY | Facility: CLINIC | Age: 60
End: 2025-03-22
Attending: PHYSICIAN ASSISTANT
Payer: COMMERCIAL

## 2025-03-22 ENCOUNTER — OFFICE VISIT (OUTPATIENT)
Dept: URGENT CARE | Facility: URGENT CARE | Age: 60
End: 2025-03-22
Payer: COMMERCIAL

## 2025-03-22 VITALS
OXYGEN SATURATION: 99 % | SYSTOLIC BLOOD PRESSURE: 146 MMHG | HEART RATE: 71 BPM | DIASTOLIC BLOOD PRESSURE: 78 MMHG | RESPIRATION RATE: 16 BRPM | TEMPERATURE: 97.3 F | WEIGHT: 220.2 LBS

## 2025-03-22 DIAGNOSIS — R06.2 WHEEZING: ICD-10-CM

## 2025-03-22 DIAGNOSIS — B37.31 YEAST VAGINITIS: ICD-10-CM

## 2025-03-22 DIAGNOSIS — M25.471 ANKLE EDEMA, BILATERAL: ICD-10-CM

## 2025-03-22 DIAGNOSIS — J98.8 WHEEZING-ASSOCIATED RESPIRATORY INFECTION (WARI): Primary | ICD-10-CM

## 2025-03-22 DIAGNOSIS — R05.1 ACUTE COUGH: ICD-10-CM

## 2025-03-22 DIAGNOSIS — J45.901 EXACERBATION OF ASTHMA, UNSPECIFIED ASTHMA SEVERITY, UNSPECIFIED WHETHER PERSISTENT: ICD-10-CM

## 2025-03-22 DIAGNOSIS — M25.472 ANKLE EDEMA, BILATERAL: ICD-10-CM

## 2025-03-22 LAB
ANION GAP SERPL CALCULATED.3IONS-SCNC: 9 MMOL/L (ref 3–14)
BASOPHILS # BLD AUTO: 0.1 10E3/UL (ref 0–0.2)
BASOPHILS NFR BLD AUTO: 1 %
BUN SERPL-MCNC: 8 MG/DL (ref 7–30)
CALCIUM SERPL-MCNC: 9.7 MG/DL (ref 8.5–10.1)
CHLORIDE BLD-SCNC: 108 MMOL/L (ref 94–109)
CO2 SERPL-SCNC: 28 MMOL/L (ref 20–32)
CREAT SERPL-MCNC: 0.8 MG/DL (ref 0.52–1.04)
EGFRCR SERPLBLD CKD-EPI 2021: 85 ML/MIN/1.73M2
EOSINOPHIL # BLD AUTO: 0.9 10E3/UL (ref 0–0.7)
EOSINOPHIL NFR BLD AUTO: 13 %
ERYTHROCYTE [DISTWIDTH] IN BLOOD BY AUTOMATED COUNT: 13 % (ref 10–15)
GLUCOSE BLD-MCNC: 93 MG/DL (ref 70–99)
HCT VFR BLD AUTO: 41.2 % (ref 35–47)
HGB BLD-MCNC: 13.5 G/DL (ref 11.7–15.7)
IMM GRANULOCYTES # BLD: 0 10E3/UL
IMM GRANULOCYTES NFR BLD: 0 %
LYMPHOCYTES # BLD AUTO: 2.1 10E3/UL (ref 0.8–5.3)
LYMPHOCYTES NFR BLD AUTO: 29 %
MCH RBC QN AUTO: 31 PG (ref 26.5–33)
MCHC RBC AUTO-ENTMCNC: 32.8 G/DL (ref 31.5–36.5)
MCV RBC AUTO: 95 FL (ref 78–100)
MONOCYTES # BLD AUTO: 0.6 10E3/UL (ref 0–1.3)
MONOCYTES NFR BLD AUTO: 8 %
NEUTROPHILS # BLD AUTO: 3.6 10E3/UL (ref 1.6–8.3)
NEUTROPHILS NFR BLD AUTO: 50 %
PLATELET # BLD AUTO: 244 10E3/UL (ref 150–450)
POTASSIUM BLD-SCNC: 5.1 MMOL/L (ref 3.4–5.3)
RBC # BLD AUTO: 4.35 10E6/UL (ref 3.8–5.2)
SODIUM SERPL-SCNC: 145 MMOL/L (ref 135–145)
WBC # BLD AUTO: 7.2 10E3/UL (ref 4–11)

## 2025-03-22 PROCEDURE — 71046 X-RAY EXAM CHEST 2 VIEWS: CPT | Mod: TC | Performed by: RADIOLOGY

## 2025-03-22 PROCEDURE — 3077F SYST BP >= 140 MM HG: CPT | Performed by: PHYSICIAN ASSISTANT

## 2025-03-22 PROCEDURE — 80048 BASIC METABOLIC PNL TOTAL CA: CPT | Performed by: PHYSICIAN ASSISTANT

## 2025-03-22 PROCEDURE — 3078F DIAST BP <80 MM HG: CPT | Performed by: PHYSICIAN ASSISTANT

## 2025-03-22 PROCEDURE — 87798 DETECT AGENT NOS DNA AMP: CPT | Performed by: PHYSICIAN ASSISTANT

## 2025-03-22 PROCEDURE — 36415 COLL VENOUS BLD VENIPUNCTURE: CPT | Performed by: PHYSICIAN ASSISTANT

## 2025-03-22 PROCEDURE — 99204 OFFICE O/P NEW MOD 45 MIN: CPT | Performed by: PHYSICIAN ASSISTANT

## 2025-03-22 PROCEDURE — 85025 COMPLETE CBC W/AUTO DIFF WBC: CPT | Performed by: PHYSICIAN ASSISTANT

## 2025-03-22 RX ORDER — METHYLPREDNISOLONE 4 MG/1
TABLET ORAL
Qty: 21 TABLET | Refills: 0 | Status: SHIPPED | OUTPATIENT
Start: 2025-03-22

## 2025-03-22 RX ORDER — PHENTERMINE HYDROCHLORIDE 15 MG/1
1 CAPSULE ORAL
COMMUNITY
End: 2025-03-22

## 2025-03-22 RX ORDER — PHENTERMINE HYDROCHLORIDE 37.5 MG/1
37.5 CAPSULE ORAL
COMMUNITY
Start: 2024-03-15 | End: 2025-03-22

## 2025-03-22 RX ORDER — DOXYCYCLINE 100 MG/1
100 CAPSULE ORAL 2 TIMES DAILY
Qty: 20 CAPSULE | Refills: 0 | Status: SHIPPED | OUTPATIENT
Start: 2025-03-22 | End: 2025-04-01

## 2025-03-22 RX ORDER — LEVOTHYROXINE SODIUM 75 UG/1
75 TABLET ORAL
COMMUNITY
Start: 2024-11-04

## 2025-03-22 RX ORDER — FLUTICASONE FUROATE AND VILANTEROL TRIFENATATE 200; 25 UG/1; UG/1
POWDER RESPIRATORY (INHALATION)
COMMUNITY
Start: 2024-08-10 | End: 2025-03-22 | Stop reason: ALTCHOICE

## 2025-03-22 RX ORDER — AZELASTINE HYDROCHLORIDE 137 UG/1
SPRAY, METERED NASAL
COMMUNITY
Start: 2024-09-10 | End: 2025-03-22

## 2025-03-22 RX ORDER — VALACYCLOVIR HYDROCHLORIDE 1 G/1
1 TABLET, FILM COATED ORAL
COMMUNITY
Start: 2024-11-22

## 2025-03-22 RX ORDER — LEVOTHYROXINE SODIUM 100 UG/1
TABLET ORAL
COMMUNITY
End: 2025-03-22 | Stop reason: DRUGHIGH

## 2025-03-22 RX ORDER — LORAZEPAM 1 MG/1
TABLET ORAL
COMMUNITY

## 2025-03-22 RX ORDER — FLUCONAZOLE 150 MG/1
150 TABLET ORAL ONCE
Qty: 1 TABLET | Refills: 0 | Status: SHIPPED | OUTPATIENT
Start: 2025-03-22 | End: 2025-03-22

## 2025-03-22 RX ORDER — AZITHROMYCIN 250 MG/1
TABLET, FILM COATED ORAL
COMMUNITY
Start: 2025-03-18

## 2025-03-22 RX ORDER — NYSTATIN TOPICAL POWDER 100000 U/G
POWDER TOPICAL
COMMUNITY
Start: 2024-11-18

## 2025-03-22 RX ORDER — OLOPATADINE HYDROCHLORIDE 1 MG/ML
SOLUTION/ DROPS OPHTHALMIC
COMMUNITY
End: 2025-03-22

## 2025-03-22 RX ORDER — BUDESONIDE 0.5 MG/2ML
INHALANT ORAL
COMMUNITY
Start: 2025-03-18

## 2025-03-22 RX ORDER — MUPIROCIN 20 MG/G
OINTMENT TOPICAL
COMMUNITY
Start: 2024-12-16 | End: 2025-03-22

## 2025-03-22 NOTE — PROGRESS NOTES
ASSESSMENT/PLAN:    (J98.8) Wheezing-associated respiratory infection (WARI)  (primary encounter diagnosis)    MDM #1): Prolonged cough and wheezing with interval worsening and associated asthma exacerbation requiring oral steroids. Occult pneumonia is in differential.  No  respiratory distress requiring further ER or hospital inpatient management now. Treatment plan as per below. I have advised low threshold for medical follow-up if symptoms fail to improve in the next 2-3 days, if symptoms fail to fully resolve in the next 10 days, and sooner if symptoms worsen. Urgent and emergent follow-up criteria are also reviewed. Please see below patient discharge summary (which I reviewed with patient today verbally and provided in printed form and in MyChart for home review).     MDM #2) Trace bilateral pedal edema of uncertain etiology.  No evidence of acute kidney injury or electrolyte disturbances/basic metabolic panel is unremarkable.  Longstanding history of intermittent bilateral pedal edema. No suggestion of acute CHF.     Plan: doxycycline monohydrate (MONODOX) 100 MG         capsule, methylPREDNISolone (MEDROL DOSEPAK) 4         MG tablet therapy pack    AVS/Plan:     March 22, 2025 Urgent  Care Plan:       1. Start the Doxycycline antibiotic I prescribed today     2. I prescribed a Medrol DosePak (the steroid you have tolerated in the past) for your asthma exacerbation     3. Use your albuterol inhaler (2 puffs every 4-6 hours)  or your Albuterol nebs-as needed for wheezing     4. Continue all the breathing medications prescribed by your asthma specialist     5. Follow-up with your primary care provider if your wheezing and cough symptoms do not improve after 3 days of treatment provided here today, if you are not all better in 10 days, and sooner if any new or worsening symptoms.     6. Start your Hydrochlorothiazide diuretic (that you have used in the past for lower leg/ankle swelling).  Follow-up with your  primary care provider team next week for a recheck. Go to the emergency room if you have any sudden or severe increased swelling or if you develop any cardiac symptoms (as we reviewed today).      6. If you have any sudden severe worsening of your current symptoms (as we reviewed today) Go to the EMERGENCY ROOM       (J45.901) Exacerbation of asthma, unspecified asthma severity, unspecified whether persistent  Plan: CBC with Platelets & Differential, Basic         metabolic panel  (Ca, Cl, CO2, Creat, Gluc, K,         Na, BUN), XR Chest 2 Views, B.         pertussis/parapertussis PCR-NP,         methylPREDNISolone (MEDROL DOSEPAK) 4 MG tablet        therapy pack            (M25.471,  M25.472) Ankle edema, bilateral  Plan: CBC with Platelets & Differential, Basic         metabolic panel  (Ca, Cl, CO2, Creat, Gluc, K,         Na, BUN), XR Chest 2 Views, B.         pertussis/parapertussis PCR-NP            (R05.1) Acute cough  Plan: CBC with Platelets & Differential, Basic         metabolic panel  (Ca, Cl, CO2, Creat, Gluc, K,         Na, BUN), XR Chest 2 Views, B.         pertussis/parapertussis PCR-NP      (R06.2) Wheezing  Plan: CBC with Platelets & Differential, Basic         metabolic panel  (Ca, Cl, CO2, Creat, Gluc, K,         Na, BUN), XR Chest 2 Views, B.         pertussis/parapertussis PCR-NP,         methylPREDNISolone (MEDROL DOSEPAK) 4 MG tablet        therapy pack              This progress note has been dictated, with use of voice recognition software. Any grammatical, typographical, or context errors are unintentional and inherent to use of voice recognition software.  ---------------      Chief Complaint   Patient presents with    Urgent Care     Pt presents with cough with phlegm, wheezing and sob X 2 weeks. Pt has  a hx of asthma. Also has a hx of edema in her joints and wants it checked.               SUBJECTIVE:    Nunu Martines is a 59-year-old female, with a past medical history that  includes asthma and intermittent LE edema, presenting to urgent care today for evaluation of the below 2 concerns.    #1) Patient reports a 2-3 week history of cough, wheezing, and shortness of breath.  She was seen by her allergist on Tuesday (4 days ago) and was prescribed a Z-Jesus, Tessalon capsules, and advised to restart use of her Pulmicort.  Patient states she has been doing all of the above, but tells me symptoms are worsening.  Cough is described as more robust, now productive with purulent sputum, and associated with increased wheezing despite use of all previously prescribed asthma medications.  Albuterol helps some, but is reportedly short-lived and gives only partial relief.    #2) Patient states she is having swelling of the feet and ankles bilaterally for the past few days.  She has had intermittent similar swelling in the past.  She does have a prescription for hydrochlorothiazide to use, as needed, but has not yet started that medication.  No acute swelling in the calf, shin, or upper leg area.  No personal history of known coagulopathy, DVT or PE.    RESP HX: Positive for asthma and allergies.  Patient states she is unable to tolerate prednisone, but has tolerated Medrol dose pack in the past for asthma exacerbations.    CARDIAC HX: No known history of congestive heart failure, coronary artery disease, cardiac arrhythmia or other known cardiac problems      ROS:  Positive as per above. No fever or chills. No associated coughing up of blood, blue lips/fingers/toes, or severe shortness of breath (confirms they are still able to to all self cares and activities of daily living). No acute fainting, chest pain (other than sore lungs during coughing spells). No racing or irregular heartbeats. No acute onset abdominal pain, nausea, vomiting, or diarrhea. No severe body aches, severe headaches, rashes, hives, joint swelling or other acute illness symptoms.     No past medical history on file.    Patient  Active Problem List   Diagnosis    Mild intermittent asthma with exacerbation    Acute respiratory failure, unspecified whether with hypoxia or hypercapnia (H)    COVID-19 virus infection     Current Outpatient Medications   Medication Sig Dispense Refill    albuterol (PROAIR HFA/PROVENTIL HFA/VENTOLIN HFA) 108 (90 Base) MCG/ACT inhaler Inhale 2 puffs into the lungs every 6 hours as needed for shortness of breath, wheezing or cough 18 g 0    albuterol (PROVENTIL) (2.5 MG/3ML) 0.083% neb solution Take 1 vial (2.5 mg) by nebulization every 6 hours as needed for shortness of breath, wheezing or cough 90 mL 0    benzonatate (TESSALON) 100 MG capsule Take 1 capsule (100 mg) by mouth 3 times daily as needed for cough 21 capsule 0    cetirizine (ZYRTEC ALLERGY) 10 MG tablet Take 1 tablet by mouth as needed      clindamycin (CLEOCIN T) 1 % external lotion Apply topically 2 times daily as needed (acne)      diazepam (VALIUM) 10 MG tablet TAKE 1 TABLET BY MOUTH EVERY 8 HOURS AS NEEDED  0    eletriptan (RELPAX) 40 MG tablet as needed.      hydrochlorothiazide (HYDRODIURIL) 25 MG tablet TAKE 1 (ONE) TABLET DAILY IN THE MORNING AS NEEDED      hydrocortisone 2.5 % cream Apply topically 2 times daily as needed for rash      KLAYESTA [id number] UNIT/GM external powder APPLY TO RASH ONCE DAILY.      levothyroxine (SYNTHROID) 75 MCG tablet Take 75 mcg by mouth.      LORazepam (ATIVAN) 1 MG tablet 1 tablet at bedtime as needed Orally Once a day      montelukast (SINGULAIR) 10 MG tablet TAKE 1 TABLET BY MOUTH ONCE EVERY EVENING.      ondansetron (ZOFRAN) 4 MG tablet TAKE ONE TABLET BY MOUTH UP TO THREE TIMES PER DAY AS NEEDED  3    traZODone (DESYREL) 50 MG tablet Take 25-50 mg by mouth nightly as needed for sleep 25-50 MG per patient  0    azithromycin (ZITHROMAX) 250 MG tablet TAKE 2 TABLETS BY MOUTH TODAY, THEN TAKE 1 TABLET DAILY FOR 4 DAYS AS DIRECTED (Patient not taking: Reported on 3/22/2025)      budesonide (PULMICORT) 0.5 MG/2ML  neb solution INHALE 1 VIAL VIA NEBULIZER TWICE A DAY (Patient not taking: Reported on 3/22/2025)      valACYclovir (VALTREX) 1000 mg tablet Take 1 tablet by mouth 2 times daily. (Patient not taking: Reported on 3/22/2025)       No current facility-administered medications for this visit.         OBJECTIVE:  BP (!) 146/78   Pulse 71   Temp 97.3  F (36.3  C) (Tympanic)   Resp 16   Wt 99.9 kg (220 lb 3.2 oz)   SpO2 99%       General appearance: alert and no apparent distress  Skin color is uniform in color and without rash.  HEENT:   Conjunctiva not injected.  Sclera clear.  Left TM is normal: no effusions, no erythema, and normal landmarks.  Right TM is normal: no effusions, no erythema, and normal landmarks.  Nasal mucosa is Congested  Oropharyngeal exam is normal: no lesions, erythema, adenopathy or exudate.  NECK: Trachea is midline. Neck is supple, FROM with no adenopathy. No JVD.   CARDIAC:NORMAL - regular rate and rhythm without murmur.  RESP: No increased work of breathing at rest--able to speak full sentences without pause. Positive for scattered rhonchi and wheezing throughout. No rales. Still moving air well into all listening areas today.   NEURO: Alert and oriented.  Normal speech and mentation.  CN II/XII grossly intact.  Gait within normal limits.    LE: Trace pedal edema feet and ankles bilaterally. No pitting edema. No asymmetry. No edema lower legs or upper legs.      Chest X-Ray: I reviewed my impression (No masses or infiltrates. No cardiac enlargement. No pleural effusion. No pulmonary cephalization) with patient during today's office visit.  Radiologist over-read pending. Patient will need to be called if there are any acute discrepant findings on radiologist over-read.     Component      Latest Ref Rng 3/22/2025  10:42 AM   WBC      4.0 - 11.0 10e3/uL 7.2    RBC Count      3.80 - 5.20 10e6/uL 4.35    Hemoglobin      11.7 - 15.7 g/dL 13.5    Hematocrit      35.0 - 47.0 % 41.2    MCV      78 -  100 fL 95    MCH      26.5 - 33.0 pg 31.0    MCHC      31.5 - 36.5 g/dL 32.8    RDW      10.0 - 15.0 % 13.0    Platelet Count      150 - 450 10e3/uL 244    % Neutrophils      % 50    % Lymphocytes      % 29    % Monocytes      % 8    % Eosinophils      % 13    % Basophils      % 1    % Immature Granulocytes      % 0    Absolute Neutrophils      1.6 - 8.3 10e3/uL 3.6    Absolute Lymphocytes      0.8 - 5.3 10e3/uL 2.1    Absolute Monocytes      0.0 - 1.3 10e3/uL 0.6    Absolute Eosinophils      0.0 - 0.7 10e3/uL 0.9 (H)    Absolute Basophils      0.0 - 0.2 10e3/uL 0.1    Absolute Immature Granulocytes      <=0.4 10e3/uL 0.0    Sodium      135 - 145 mmol/L 145    Potassium      3.4 - 5.3 mmol/L 5.1    Chloride      94 - 109 mmol/L 108    Carbon Dioxide      20 - 32 mmol/L 28    Anion Gap      3 - 14 mmol/L 9    Urea Nitrogen      7 - 30 mg/dL 8    Creatinine      0.52 - 1.04 mg/dL 0.80    GFR Estimate      >60 mL/min/1.73m2 85    Calcium      8.5 - 10.1 mg/dL 9.7    Glucose      70 - 99 mg/dL 93      Pertussis test result is pending

## 2025-03-22 NOTE — PATIENT INSTRUCTIONS
March 22, 2025 Urgent  Care Plan:       1. Start the Doxycycline antibiotic I prescribed today     2. I prescribed a Medrol DosePak (the steroid you have tolerated in the past) for your asthma exacerbation     3. Use your albuterol inhaler (2 puffs every 4-6 hours)  or your Albuterol nebs-as needed for wheezing     4. Continue all the breathing medications prescribed by your asthma specialist     5. Follow-up with your primary care provider if your wheezing and cough symptoms do not improve after 3 days of treatment provided here today, if you are not all better in 10 days, and sooner if any new or worsening symptoms.     6. Start your Hydrochlorothiazide diuretic (that you have used in the past for lower leg/ankle swelling).  Follow-up with your primary care provider team next week for a recheck. Go to the emergency room if you have any sudden or severe increased swelling or if you develop any cardiac symptoms (as we reviewed today).      6. If you have any sudden severe worsening of your current symptoms (as we reviewed today) Go to the EMERGENCY ROOM

## 2025-03-23 LAB
B PARAPERT DNA SPEC QL NAA+PROBE: NOT DETECTED
B PERT DNA SPEC QL NAA+PROBE: NOT DETECTED

## 2025-04-12 ENCOUNTER — HEALTH MAINTENANCE LETTER (OUTPATIENT)
Age: 60
End: 2025-04-12

## 2025-04-14 DIAGNOSIS — J45.21 MILD INTERMITTENT ASTHMA WITH EXACERBATION: Primary | ICD-10-CM

## 2025-04-14 NOTE — Clinical Note
Future Appointments 7/28/2025  9:00 AM    [facility] PULMON[facility]      [facility]              CS 7/28/2025  10:00 AM   Naya Hammond MD     [facility]              [facility]

## 2025-06-11 ENCOUNTER — TRANSCRIBE ORDERS (OUTPATIENT)
Dept: OTHER | Age: 60
End: 2025-06-11

## 2025-06-11 DIAGNOSIS — M54.50 LUMBOSACRAL PAIN: ICD-10-CM

## 2025-06-11 DIAGNOSIS — M25.561 RIGHT KNEE PAIN: Primary | ICD-10-CM

## 2025-06-26 ASSESSMENT — ACTIVITIES OF DAILY LIVING (ADL)
STAND: ACTIVITY IS FAIRLY DIFFICULT
RISE FROM A CHAIR: ACTIVITY IS VERY DIFFICULT
HOW_WOULD_YOU_RATE_THE_OVERALL_FUNCTION_OF_YOUR_KNEE_DURING_YOUR_USUAL_DAILY_ACTIVITIES?: SEVERELY ABNORMAL
KNEEL ON THE FRONT OF YOUR KNEE: ACTIVITY IS VERY DIFFICULT
GO DOWN STAIRS: ACTIVITY IS VERY DIFFICULT
HOW_WOULD_YOU_RATE_THE_CURRENT_FUNCTION_OF_YOUR_KNEE_DURING_YOUR_USUAL_DAILY_ACTIVITIES_ON_A_SCALE_FROM_0_TO_100_WITH_100_BEING_YOUR_LEVEL_OF_KNEE_FUNCTION_PRIOR_TO_YOUR_INJURY_AND_0_BEING_THE_INABILITY_TO_PERFORM_ANY_OF_YOUR_USUAL_DAILY_ACTIVITIES?: 25
LIMPING: THE SYMPTOM AFFECTS MY ACTIVITY MODERATELY
RAW_SCORE: 20
SIT WITH YOUR KNEE BENT: ACTIVITY IS FAIRLY DIFFICULT
WEAKNESS: THE SYMPTOM AFFECTS MY ACTIVITY SEVERELY
SWELLING: THE SYMPTOM AFFECTS MY ACTIVITY MODERATELY
STIFFNESS: THE SYMPTOM AFFECTS MY ACTIVITY MODERATELY
HOW_WOULD_YOU_RATE_THE_OVERALL_FUNCTION_OF_YOUR_KNEE_DURING_YOUR_USUAL_DAILY_ACTIVITIES?: SEVERELY ABNORMAL
SWELLING: THE SYMPTOM AFFECTS MY ACTIVITY MODERATELY
PAIN: THE SYMPTOM AFFECTS MY ACTIVITY SEVERELY
GO UP STAIRS: ACTIVITY IS VERY DIFFICULT
SQUAT: I AM UNABLE TO DO THE ACTIVITY
PAIN: THE SYMPTOM AFFECTS MY ACTIVITY SEVERELY
AS_A_RESULT_OF_YOUR_KNEE_INJURY,_HOW_WOULD_YOU_RATE_YOUR_CURRENT_LEVEL_OF_DAILY_ACTIVITY?: SEVERELY ABNORMAL
GO UP STAIRS: ACTIVITY IS VERY DIFFICULT
WEAKNESS: THE SYMPTOM AFFECTS MY ACTIVITY SEVERELY
LIMPING: THE SYMPTOM AFFECTS MY ACTIVITY MODERATELY
PLEASE_INDICATE_YOR_PRIMARY_REASON_FOR_REFERRAL_TO_THERAPY:: KNEE
WALK: ACTIVITY IS FAIRLY DIFFICULT
HOW_WOULD_YOU_RATE_THE_CURRENT_FUNCTION_OF_YOUR_KNEE_DURING_YOUR_USUAL_DAILY_ACTIVITIES_ON_A_SCALE_FROM_0_TO_100_WITH_100_BEING_YOUR_LEVEL_OF_KNEE_FUNCTION_PRIOR_TO_YOUR_INJURY_AND_0_BEING_THE_INABILITY_TO_PERFORM_ANY_OF_YOUR_USUAL_DAILY_ACTIVITIES?: 25
RISE FROM A CHAIR: ACTIVITY IS VERY DIFFICULT
KNEEL ON THE FRONT OF YOUR KNEE: ACTIVITY IS VERY DIFFICULT
GIVING WAY, BUCKLING OR SHIFTING OF KNEE: THE SYMPTOM AFFECTS MY ACTIVITY MODERATELY
KNEE_ACTIVITY_OF_DAILY_LIVING_SCORE: 28.57
SQUAT: I AM UNABLE TO DO THE ACTIVITY
STAND: ACTIVITY IS FAIRLY DIFFICULT
KNEE_ACTIVITY_OF_DAILY_LIVING_SUM: 20
GIVING WAY, BUCKLING OR SHIFTING OF KNEE: THE SYMPTOM AFFECTS MY ACTIVITY MODERATELY
WALK: ACTIVITY IS FAIRLY DIFFICULT
STIFFNESS: THE SYMPTOM AFFECTS MY ACTIVITY MODERATELY
GO DOWN STAIRS: ACTIVITY IS VERY DIFFICULT
AS_A_RESULT_OF_YOUR_KNEE_INJURY,_HOW_WOULD_YOU_RATE_YOUR_CURRENT_LEVEL_OF_DAILY_ACTIVITY?: SEVERELY ABNORMAL
SIT WITH YOUR KNEE BENT: ACTIVITY IS FAIRLY DIFFICULT

## 2025-07-01 ENCOUNTER — THERAPY VISIT (OUTPATIENT)
Dept: PHYSICAL THERAPY | Facility: CLINIC | Age: 60
End: 2025-07-01
Payer: COMMERCIAL

## 2025-07-01 DIAGNOSIS — M25.561 RIGHT KNEE PAIN: Primary | ICD-10-CM

## 2025-07-01 DIAGNOSIS — M54.41 BILATERAL LOW BACK PAIN WITH RIGHT-SIDED SCIATICA: ICD-10-CM

## 2025-07-01 PROCEDURE — 97162 PT EVAL MOD COMPLEX 30 MIN: CPT | Mod: GP | Performed by: PHYSICAL THERAPIST

## 2025-07-01 PROCEDURE — 97110 THERAPEUTIC EXERCISES: CPT | Mod: GP | Performed by: PHYSICAL THERAPIST

## 2025-07-01 NOTE — PROGRESS NOTES
"PHYSICAL THERAPY EVALUATION  Type of Visit: Evaluation       Fall Risk Screen:  Have you fallen 2 or more times in the past year?: Yes  Have you fallen and had an injury in the past year?: No      Subjective         Presenting condition or subjective complaint: Right knee and lower back assessment  Date of onset: 06/11/25 (order date)    Relevant medical history: Anemia; Arthritis; Asthma; Cold or hot arm or leg; Depression; Dizziness; Fibromyalgia; Migraines or headaches; Numbness or tingling in perianal area; Osteoporosis; Overweight; Pain at night or rest; Severe dizziness; Severe headaches; Significant weakness     Had MRI's of R knee in 2018 and 2024. Showed R lateral meniscus tear and Grade II/III chondromalacia. Initial pain in 2018, no specific injury but had sudden onset of pain. Also R LBP/leg pain in ~ 2018 with no significant injury . Current sxs:R anterior/lateral knee pain, R LB/hip/thigh pain and into R knee. Also gets referral down into R lateral leg/ankle and foot. R knee WORSE with stairs (very difficult), walking, yoga positions, getting on/off floor, on/off couch. R LB/leg sxs WORSE with doing anything \"too long\" sitting/standing, walking, like sleeping/lay down may make leg numb and cold feeling .MRI 2024 showed R L5-S1 disc extrusion. Occasional tingling into R lateral foot. Has had formal PT  in the past and chiro . Also reports some L lateral foot pain on/off for 2 years.   Dates & types of surgery: Tonsils 20+ Years    Prior diagnostic imaging/testing results: MRI; X-ray     Prior therapy history for the same diagnosis, illness or injury: No      Living Environment  Social support: With a significant other or spouse   Type of home: House   Stairs to enter the home: Yes 2 Is there a railing: No     Ramp: No   Stairs inside the home: Yes 32 Is there a railing: Yes     Help at home: Home management tasks (cooking, cleaning); Home and Yard maintenance tasks  Equipment owned:       Employment: " Not Applicable    Hobbies/Interests: Reading, antiques/research, movies, animals    Patient goals for therapy: Walking, stairs, yoga, being able to get off the floor, being able to get off of furniture    Pain assessment: R lateral  knee 7/10, R LB /thigh 8/10.     Objective   KNEE EVALUATION  INTEGUMENTARY (edema, incisions): girth around inferior patella pole R 17.0 in, L 16.25 in.  POSTURE: stands with R knee in slight flexion  GAIT:  Gait Deviations: decreased stance time R LE, decreased push-off R   BALANCE/PROPRIOCEPTION: Single Leg Stance Eyes Open (seconds): R ~3-4 seconds, L 5-7 sec  ROM: PROM R knee 0-5-~125 with end range tightness in supine  STRENGTH: poor quad strength R,extensor lag with SLR  FLEXIBILITY: tightness R gastroc  FUNCTIONAL TESTS: Double Leg Squat: Anterior knee translation, Knee valgus, Hip internal rotation, Improper use of glutes/hips, and unable to go past ~ 45 deg  PALPATION: TTP along R lateral patella and entire joint line    LUMBAR SPINE EVALUATION  POSTURE: Standing Posture: Rounded shoulders, Forward head, Lordosis decreased, Thoracic kyphosis increased   ROM: Lumbar AROM RFIS to mid-tibias P R lateral thigh pain, NW. Extension mod loss with central LBP, NE on R LE.   STRENGTH: pain and difficulty with bridge, fair TA set with exhale  MYOTOMES: NA today, will next visit  DTR S: NA today, will next visit  DERMATOMES: WNL  NEURAL TENSION: + slump R, -L  LUMBAR/HIP Special Tests: PROM B hips WNL and painfree, -KIKO/-FADIR   PALPATION: TTP lumbar paraspinals and across LB/sacrum into R glute    Assessment & Plan   CLINICAL IMPRESSIONS  Medical Diagnosis: Right knee pain (M25.561)  - Primary    Lumbosacral pain (M54.50)    Treatment Diagnosis: R knee pain, LBP/R radicular pain   Impression/Assessment: Patient is a 59 year old female with R knee and R LB/leg complaints.  The following significant findings have been identified: Pain, Decreased ROM/flexibility, Decreased joint mobility,  Decreased strength, Decreased proprioception, Inflammation, Edema, Impaired gait, Impaired muscle performance, Decreased activity tolerance, and Impaired posture. These impairments interfere with their ability to perform self care tasks, work tasks, recreational activities, household chores, driving , household mobility, and community mobility as compared to previous level of function.     Clinical Decision Making (Complexity):  Clinical Presentation: Evolving/Changing  Clinical Presentation Rationale: based on medical and personal factors listed in PT evaluation  Clinical Decision Making (Complexity): Moderate complexity    PLAN OF CARE  Treatment Interventions:  Interventions: Gait Training, Manual Therapy, Neuromuscular Re-education, Therapeutic Activity, Therapeutic Exercise, Self-Care/Home Management    Long Term Goals     PT Goal 1  Target Date: 08/28/25  PT Goal 2  Target Date: 08/28/25      Frequency of Treatment: 1x week  Duration of Treatment: 8 weeks    Education Assessment:        Risks and benefits of evaluation/treatment have been explained.   Patient/Family/caregiver agrees with Plan of Care.     Evaluation Time:           Signing Clinician: Tiarra Almendarez, PT,OCS        [facility]                                                                                   OUTPATIENT PHYSICAL THERAPY      PLAN OF TREATMENT FOR OUTPATIENT REHABILITATION   Patient's Last Name, First Name, M.Nunu Gil YOB: 1965   Provider's Name   [facility]   Medical Record No.  [id number]     Onset Date: 06/11/25 (order date)  Start of Care Date: 07/01/25     Medical Diagnosis:  Right knee pain (M25.561)  - Primary    Lumbosacral pain (M54.50)      PT Treatment Diagnosis:  R knee pain, LBP/R radicular pain Plan of Treatment  Frequency/Duration: 1x week/ 8 weeks    Certification date from 07/01/25 to 08/28/25         See note for  plan of treatment details and functional goals     Tiarra Almendarez, PT,OCS                         I CERTIFY THE NEED FOR THESE SERVICES FURNISHED UNDER        THIS PLAN OF TREATMENT AND WHILE UNDER MY CARE .             Physician Signature               Date    X_____________________________________________________                  Referring Provider:  Lyndon Stewart DC    Initial Assessment  See Epic Evaluation- Start of Care Date: 07/01/25

## 2025-07-03 PROBLEM — M25.561 RIGHT KNEE PAIN: Status: ACTIVE | Noted: 2025-07-03

## 2025-07-03 PROBLEM — M54.41 BILATERAL LOW BACK PAIN WITH RIGHT-SIDED SCIATICA: Status: ACTIVE | Noted: 2025-07-03

## 2025-07-16 ENCOUNTER — THERAPY VISIT (OUTPATIENT)
Dept: PHYSICAL THERAPY | Facility: CLINIC | Age: 60
End: 2025-07-16
Payer: COMMERCIAL

## 2025-07-16 DIAGNOSIS — M25.561 RIGHT KNEE PAIN: Primary | ICD-10-CM

## 2025-07-16 DIAGNOSIS — M54.41 BILATERAL LOW BACK PAIN WITH RIGHT-SIDED SCIATICA: ICD-10-CM

## 2025-07-16 PROCEDURE — 97110 THERAPEUTIC EXERCISES: CPT | Mod: GP | Performed by: PHYSICAL THERAPIST

## 2025-08-06 ENCOUNTER — THERAPY VISIT (OUTPATIENT)
Dept: PHYSICAL THERAPY | Facility: CLINIC | Age: 60
End: 2025-08-06
Payer: COMMERCIAL

## 2025-08-06 DIAGNOSIS — M54.41 BILATERAL LOW BACK PAIN WITH RIGHT-SIDED SCIATICA: ICD-10-CM

## 2025-08-06 DIAGNOSIS — M25.561 RIGHT KNEE PAIN: Primary | ICD-10-CM

## 2025-08-06 PROCEDURE — 97530 THERAPEUTIC ACTIVITIES: CPT | Mod: GP | Performed by: PHYSICAL THERAPIST

## 2025-08-11 ENCOUNTER — TRANSCRIBE ORDERS (OUTPATIENT)
Dept: OTHER | Age: 60
End: 2025-08-11

## 2025-08-11 DIAGNOSIS — G89.29 CHRONIC PAIN: ICD-10-CM

## 2025-08-11 DIAGNOSIS — M54.50 LUMBAGO: ICD-10-CM

## 2025-08-11 DIAGNOSIS — M17.9 KNEE OSTEOARTHRITIS: ICD-10-CM

## 2025-08-11 DIAGNOSIS — M67.959 TENDINOPATHY OF GLUTEAL REGION: Primary | ICD-10-CM

## 2025-08-16 ENCOUNTER — HEALTH MAINTENANCE LETTER (OUTPATIENT)
Age: 60
End: 2025-08-16

## 2025-08-20 ENCOUNTER — THERAPY VISIT (OUTPATIENT)
Dept: PHYSICAL THERAPY | Facility: CLINIC | Age: 60
End: 2025-08-20
Payer: COMMERCIAL

## 2025-08-20 DIAGNOSIS — G89.29 CHRONIC PAIN: ICD-10-CM

## 2025-08-20 DIAGNOSIS — M67.959 TENDINOPATHY OF GLUTEAL REGION: ICD-10-CM

## 2025-08-20 DIAGNOSIS — M17.9 KNEE OSTEOARTHRITIS: ICD-10-CM

## 2025-08-20 DIAGNOSIS — M54.50 LUMBAGO: ICD-10-CM

## 2025-08-20 PROCEDURE — 97112 NEUROMUSCULAR REEDUCATION: CPT | Mod: GP | Performed by: PHYSICAL THERAPIST

## 2025-08-20 PROCEDURE — 97162 PT EVAL MOD COMPLEX 30 MIN: CPT | Mod: GP | Performed by: PHYSICAL THERAPIST

## 2025-08-25 ENCOUNTER — THERAPY VISIT (OUTPATIENT)
Dept: PHYSICAL THERAPY | Facility: CLINIC | Age: 60
End: 2025-08-25
Payer: COMMERCIAL

## 2025-08-25 DIAGNOSIS — M67.959 TENDINOPATHY OF GLUTEAL REGION: Primary | ICD-10-CM

## 2025-08-25 DIAGNOSIS — M54.50 LUMBAGO: ICD-10-CM

## 2025-08-25 DIAGNOSIS — M17.9 KNEE OSTEOARTHRITIS: ICD-10-CM

## 2025-08-25 DIAGNOSIS — G89.29 CHRONIC PAIN: ICD-10-CM

## 2025-08-25 PROCEDURE — 97112 NEUROMUSCULAR REEDUCATION: CPT | Mod: GP | Performed by: PHYSICAL THERAPIST

## 2025-08-25 PROCEDURE — 97116 GAIT TRAINING THERAPY: CPT | Mod: GP | Performed by: PHYSICAL THERAPIST
